# Patient Record
Sex: FEMALE | Race: BLACK OR AFRICAN AMERICAN | NOT HISPANIC OR LATINO
[De-identification: names, ages, dates, MRNs, and addresses within clinical notes are randomized per-mention and may not be internally consistent; named-entity substitution may affect disease eponyms.]

---

## 2017-01-10 ENCOUNTER — MEDICATION RENEWAL (OUTPATIENT)
Age: 72
End: 2017-01-10

## 2017-02-12 ENCOUNTER — EMERGENCY (EMERGENCY)
Facility: HOSPITAL | Age: 72
LOS: 1 days | Discharge: HOME | End: 2017-02-12
Attending: EMERGENCY MEDICINE | Admitting: EMERGENCY MEDICINE
Payer: MEDICARE

## 2017-02-12 VITALS
DIASTOLIC BLOOD PRESSURE: 65 MMHG | RESPIRATION RATE: 18 BRPM | WEIGHT: 142.2 LBS | TEMPERATURE: 98 F | SYSTOLIC BLOOD PRESSURE: 124 MMHG | OXYGEN SATURATION: 99 % | HEART RATE: 93 BPM

## 2017-02-12 VITALS
RESPIRATION RATE: 18 BRPM | HEART RATE: 85 BPM | OXYGEN SATURATION: 99 % | TEMPERATURE: 98 F | DIASTOLIC BLOOD PRESSURE: 78 MMHG | SYSTOLIC BLOOD PRESSURE: 135 MMHG

## 2017-02-12 DIAGNOSIS — R10.13 EPIGASTRIC PAIN: ICD-10-CM

## 2017-02-12 DIAGNOSIS — R94.31 ABNORMAL ELECTROCARDIOGRAM [ECG] [EKG]: ICD-10-CM

## 2017-02-12 DIAGNOSIS — R10.33 PERIUMBILICAL PAIN: ICD-10-CM

## 2017-02-12 DIAGNOSIS — R11.2 NAUSEA WITH VOMITING, UNSPECIFIED: ICD-10-CM

## 2017-02-12 LAB
APPEARANCE UR: CLEAR — SIGNIFICANT CHANGE UP
BILIRUB UR-MCNC: (no result)
COLOR SPEC: YELLOW — SIGNIFICANT CHANGE UP
DIFF PNL FLD: (no result)
GLUCOSE UR QL: NEGATIVE — SIGNIFICANT CHANGE UP
HAV IGM SER-ACNC: SIGNIFICANT CHANGE UP
HBV CORE IGM SER-ACNC: SIGNIFICANT CHANGE UP
HBV SURFACE AG SER-ACNC: SIGNIFICANT CHANGE UP
HCV AB S/CO SERPL IA: 0.1 S/CO — SIGNIFICANT CHANGE UP
HCV AB SERPL-IMP: SIGNIFICANT CHANGE UP
KETONES UR-MCNC: NEGATIVE — SIGNIFICANT CHANGE UP
LEUKOCYTE ESTERASE UR-ACNC: NEGATIVE — SIGNIFICANT CHANGE UP
NITRITE UR-MCNC: NEGATIVE — SIGNIFICANT CHANGE UP
PH UR: 7 — SIGNIFICANT CHANGE UP (ref 4–8)
PROT UR-MCNC: (no result) MG/DL
SP GR SPEC: 1.01 — SIGNIFICANT CHANGE UP (ref 1–1.03)
UROBILINOGEN FLD QL: 1 E.U./DL — SIGNIFICANT CHANGE UP

## 2017-02-12 PROCEDURE — 83690 ASSAY OF LIPASE: CPT

## 2017-02-12 PROCEDURE — 93005 ELECTROCARDIOGRAM TRACING: CPT

## 2017-02-12 PROCEDURE — 74177 CT ABD & PELVIS W/CONTRAST: CPT | Mod: 26

## 2017-02-12 PROCEDURE — 80074 ACUTE HEPATITIS PANEL: CPT

## 2017-02-12 PROCEDURE — 82550 ASSAY OF CK (CPK): CPT

## 2017-02-12 PROCEDURE — 71045 X-RAY EXAM CHEST 1 VIEW: CPT

## 2017-02-12 PROCEDURE — 99285 EMERGENCY DEPT VISIT HI MDM: CPT | Mod: 25

## 2017-02-12 PROCEDURE — 81003 URINALYSIS AUTO W/O SCOPE: CPT

## 2017-02-12 PROCEDURE — 74177 CT ABD & PELVIS W/CONTRAST: CPT

## 2017-02-12 PROCEDURE — 71010: CPT | Mod: 26

## 2017-02-12 PROCEDURE — 85730 THROMBOPLASTIN TIME PARTIAL: CPT

## 2017-02-12 PROCEDURE — 84484 ASSAY OF TROPONIN QUANT: CPT

## 2017-02-12 PROCEDURE — 76705 ECHO EXAM OF ABDOMEN: CPT

## 2017-02-12 PROCEDURE — 96374 THER/PROPH/DIAG INJ IV PUSH: CPT | Mod: XU

## 2017-02-12 PROCEDURE — 85610 PROTHROMBIN TIME: CPT

## 2017-02-12 PROCEDURE — 99284 EMERGENCY DEPT VISIT MOD MDM: CPT | Mod: 25

## 2017-02-12 PROCEDURE — 82553 CREATINE MB FRACTION: CPT

## 2017-02-12 PROCEDURE — 85025 COMPLETE CBC W/AUTO DIFF WBC: CPT

## 2017-02-12 PROCEDURE — 93010 ELECTROCARDIOGRAM REPORT: CPT

## 2017-02-12 PROCEDURE — 80053 COMPREHEN METABOLIC PANEL: CPT

## 2017-02-12 PROCEDURE — 76705 ECHO EXAM OF ABDOMEN: CPT | Mod: 26

## 2017-02-12 PROCEDURE — 81001 URINALYSIS AUTO W/SCOPE: CPT

## 2017-02-12 PROCEDURE — 96375 TX/PRO/DX INJ NEW DRUG ADDON: CPT | Mod: XU

## 2017-02-12 PROCEDURE — 36415 COLL VENOUS BLD VENIPUNCTURE: CPT

## 2017-02-12 RX ORDER — MORPHINE SULFATE 50 MG/1
2 CAPSULE, EXTENDED RELEASE ORAL ONCE
Qty: 0 | Refills: 0 | Status: DISCONTINUED | OUTPATIENT
Start: 2017-02-12 | End: 2017-02-12

## 2017-02-12 RX ORDER — ONDANSETRON 8 MG/1
4 TABLET, FILM COATED ORAL ONCE
Qty: 0 | Refills: 0 | Status: COMPLETED | OUTPATIENT
Start: 2017-02-12 | End: 2017-02-12

## 2017-02-12 RX ORDER — IOHEXOL 300 MG/ML
50 INJECTION, SOLUTION INTRAVENOUS ONCE
Qty: 0 | Refills: 0 | Status: COMPLETED | OUTPATIENT
Start: 2017-02-12 | End: 2017-02-12

## 2017-02-12 RX ADMIN — MORPHINE SULFATE 2 MILLIGRAM(S): 50 CAPSULE, EXTENDED RELEASE ORAL at 07:53

## 2017-02-12 RX ADMIN — ONDANSETRON 4 MILLIGRAM(S): 8 TABLET, FILM COATED ORAL at 07:53

## 2017-02-12 RX ADMIN — IOHEXOL 50 MILLILITER(S): 300 INJECTION, SOLUTION INTRAVENOUS at 07:53

## 2017-02-12 NOTE — ED PROVIDER NOTE - ATTENDING CONTRIBUTION TO CARE
71yof with afib, HTN, CHF with epigastric pain since yesterday and N/V 71yof with afib, HTN, CHF with epigastric pain since yesterday and N/V. Although she was triaged as CP she absolutely denies CP. EKG shows afib w/o acute ischemic changes, labs notable for increased LFTs and bilirubin. RUQ sono and CT a/p w/o acute pathology, sxs improved after meds here, needs to f/u w/her PMD re: transaminitis w/referral to GI on discharge

## 2017-02-12 NOTE — ED PROVIDER NOTE - OBJECTIVE STATEMENT
70 y/o female c/o abd pain x 1 day. Pt states pain began around 2 pm yesterday. sharp pain to epigastric and mid abdomen. Pain is intermittent. + nausea and vomiting. non bloody emesis. no fever or chills. no cp, sob, or back pain. no diarrhea or constipation. Last BM this am. non bloody and no melena. no urinary sx's. no ha or dizziness. no further complaints. 72 y/o female CHF, A fib, HTN c/o abd pain x 1 day. Pt states pain began around 2 pm yesterday. sharp pain to epigastric and mid abdomen. Pain is intermittent. + nausea and vomiting. non bloody emesis. no fever or chills. no cp, sob, or back pain. no diarrhea or constipation. Last BM this am. non bloody and no melena. no urinary sx's. no ha or dizziness. no further complaints.

## 2017-02-12 NOTE — ED PROVIDER NOTE - MEDICAL DECISION MAKING DETAILS
abd pain and vomiting. pt well appearing. VSS. ecg a fib. troponin negative. pt with chest pain. ct scan done and no acute pathology. u/s ordered given elevated LFT's. no acute pathology. pt tolerating po and pain resolved in ED. case d/w pmd Dr Mujica and results faxed to her office. will f/u with pmd and GI

## 2017-02-12 NOTE — ED ADULT NURSE REASSESSMENT NOTE - NS ED NURSE REASSESS COMMENT FT1
Report received at 0815. Pt is in no acute distress at this time. Pt is on cardiac monitor. She is drinking po contrast for CT, tolerating well.

## 2017-02-12 NOTE — ED PROVIDER NOTE - GASTROINTESTINAL, MLM
Abdomen soft, + tenderness to epigastric and periumbilical region. no guarding. no rebound. no cva tenderness.

## 2017-02-28 ENCOUNTER — RX RENEWAL (OUTPATIENT)
Age: 72
End: 2017-02-28

## 2017-04-26 ENCOUNTER — RX RENEWAL (OUTPATIENT)
Age: 72
End: 2017-04-26

## 2017-05-18 ENCOUNTER — APPOINTMENT (OUTPATIENT)
Dept: HEART AND VASCULAR | Facility: CLINIC | Age: 72
End: 2017-05-18

## 2017-05-18 VITALS
SYSTOLIC BLOOD PRESSURE: 122 MMHG | BODY MASS INDEX: 42.99 KG/M2 | HEART RATE: 80 BPM | DIASTOLIC BLOOD PRESSURE: 78 MMHG | WEIGHT: 258 LBS | HEIGHT: 65 IN

## 2017-05-18 DIAGNOSIS — R00.2 PALPITATIONS: ICD-10-CM

## 2017-06-25 ENCOUNTER — RX RENEWAL (OUTPATIENT)
Age: 72
End: 2017-06-25

## 2017-08-27 ENCOUNTER — RX RENEWAL (OUTPATIENT)
Age: 72
End: 2017-08-27

## 2017-10-02 ENCOUNTER — RX RENEWAL (OUTPATIENT)
Age: 72
End: 2017-10-02

## 2017-11-14 ENCOUNTER — RX RENEWAL (OUTPATIENT)
Age: 72
End: 2017-11-14

## 2017-12-12 ENCOUNTER — APPOINTMENT (OUTPATIENT)
Dept: HEART AND VASCULAR | Facility: CLINIC | Age: 72
End: 2017-12-12
Payer: MEDICARE

## 2017-12-12 VITALS
DIASTOLIC BLOOD PRESSURE: 80 MMHG | HEIGHT: 65 IN | BODY MASS INDEX: 41.65 KG/M2 | HEART RATE: 76 BPM | WEIGHT: 250 LBS | SYSTOLIC BLOOD PRESSURE: 110 MMHG

## 2017-12-12 PROCEDURE — 99214 OFFICE O/P EST MOD 30 MIN: CPT | Mod: 25

## 2017-12-12 PROCEDURE — 93000 ELECTROCARDIOGRAM COMPLETE: CPT

## 2017-12-15 ENCOUNTER — OUTPATIENT (OUTPATIENT)
Dept: OUTPATIENT SERVICES | Facility: HOSPITAL | Age: 72
LOS: 1 days | End: 2017-12-15
Payer: MEDICARE

## 2017-12-15 PROCEDURE — 93970 EXTREMITY STUDY: CPT

## 2017-12-15 PROCEDURE — 93970 EXTREMITY STUDY: CPT | Mod: 26

## 2018-03-05 ENCOUNTER — RX RENEWAL (OUTPATIENT)
Age: 73
End: 2018-03-05

## 2018-04-12 NOTE — ED PROVIDER NOTE - RESPIRATORY, MLM
I do not, sorry   
I found the paper work it has been faxed and sent to scanning.   
Kerrie doesn't remember anything about this. The patient is coming in Thursday.  
Received FMLA forms to complete. I have since completed them. Please fax back the cover letter from [employer] along with the completed form. Then, send a copy to scanning. Keep the original in case we need it in the future. Thanks.   
Breath sounds clear and equal bilaterally.

## 2018-05-07 ENCOUNTER — MEDICATION RENEWAL (OUTPATIENT)
Age: 73
End: 2018-05-07

## 2018-06-12 ENCOUNTER — APPOINTMENT (OUTPATIENT)
Dept: HEART AND VASCULAR | Facility: CLINIC | Age: 73
End: 2018-06-12
Payer: MEDICARE

## 2018-06-12 VITALS
SYSTOLIC BLOOD PRESSURE: 115 MMHG | HEART RATE: 60 BPM | HEIGHT: 65 IN | BODY MASS INDEX: 42.49 KG/M2 | WEIGHT: 255 LBS | DIASTOLIC BLOOD PRESSURE: 80 MMHG

## 2018-06-12 PROCEDURE — 93306 TTE W/DOPPLER COMPLETE: CPT

## 2018-06-12 PROCEDURE — 93000 ELECTROCARDIOGRAM COMPLETE: CPT

## 2018-06-12 PROCEDURE — 99214 OFFICE O/P EST MOD 30 MIN: CPT | Mod: 25

## 2018-06-15 ENCOUNTER — APPOINTMENT (OUTPATIENT)
Dept: OTOLARYNGOLOGY | Facility: CLINIC | Age: 73
End: 2018-06-15
Payer: MEDICARE

## 2018-06-15 PROCEDURE — G9164: CPT | Mod: NC,GN,CK

## 2018-06-15 PROCEDURE — G9163: CPT | Mod: NC,GN,CJ

## 2018-06-15 PROCEDURE — G9162: CPT | Mod: NC,GN,CK

## 2018-06-15 PROCEDURE — 92523 SPEECH SOUND LANG COMPREHEN: CPT | Mod: GN

## 2018-07-17 ENCOUNTER — APPOINTMENT (OUTPATIENT)
Dept: NEUROLOGY | Facility: CLINIC | Age: 73
End: 2018-07-17
Payer: MEDICARE

## 2018-07-17 VITALS
DIASTOLIC BLOOD PRESSURE: 80 MMHG | SYSTOLIC BLOOD PRESSURE: 145 MMHG | HEIGHT: 65 IN | HEART RATE: 57 BPM | TEMPERATURE: 97.5 F | OXYGEN SATURATION: 98 % | BODY MASS INDEX: 38.49 KG/M2 | WEIGHT: 231 LBS

## 2018-07-17 PROCEDURE — 99205 OFFICE O/P NEW HI 60 MIN: CPT

## 2018-07-26 ENCOUNTER — FORM ENCOUNTER (OUTPATIENT)
Age: 73
End: 2018-07-26

## 2018-07-27 ENCOUNTER — APPOINTMENT (OUTPATIENT)
Dept: CT IMAGING | Facility: HOSPITAL | Age: 73
End: 2018-07-27
Payer: MEDICARE

## 2018-07-27 ENCOUNTER — OUTPATIENT (OUTPATIENT)
Dept: OUTPATIENT SERVICES | Facility: HOSPITAL | Age: 73
LOS: 1 days | End: 2018-07-27
Payer: MEDICARE

## 2018-07-27 PROCEDURE — 70450 CT HEAD/BRAIN W/O DYE: CPT | Mod: 26

## 2018-07-27 PROCEDURE — 70450 CT HEAD/BRAIN W/O DYE: CPT

## 2018-10-09 ENCOUNTER — APPOINTMENT (OUTPATIENT)
Dept: NEUROLOGY | Facility: CLINIC | Age: 73
End: 2018-10-09
Payer: MEDICARE

## 2018-10-09 VITALS
OXYGEN SATURATION: 99 % | HEIGHT: 65 IN | DIASTOLIC BLOOD PRESSURE: 88 MMHG | TEMPERATURE: 97.9 F | BODY MASS INDEX: 37.99 KG/M2 | SYSTOLIC BLOOD PRESSURE: 135 MMHG | HEART RATE: 66 BPM | WEIGHT: 228 LBS

## 2018-10-09 DIAGNOSIS — Z80.9 FAMILY HISTORY OF MALIGNANT NEOPLASM, UNSPECIFIED: ICD-10-CM

## 2018-10-09 DIAGNOSIS — R51 HEADACHE: ICD-10-CM

## 2018-10-09 PROCEDURE — 99214 OFFICE O/P EST MOD 30 MIN: CPT

## 2018-11-01 ENCOUNTER — MEDICATION RENEWAL (OUTPATIENT)
Age: 73
End: 2018-11-01

## 2019-01-31 ENCOUNTER — APPOINTMENT (OUTPATIENT)
Dept: HEART AND VASCULAR | Facility: CLINIC | Age: 74
End: 2019-01-31

## 2019-02-04 ENCOUNTER — APPOINTMENT (OUTPATIENT)
Dept: HEART AND VASCULAR | Facility: CLINIC | Age: 74
End: 2019-02-04
Payer: MEDICARE

## 2019-02-04 ENCOUNTER — NON-APPOINTMENT (OUTPATIENT)
Age: 74
End: 2019-02-04

## 2019-02-04 VITALS
HEART RATE: 63 BPM | SYSTOLIC BLOOD PRESSURE: 122 MMHG | HEIGHT: 65 IN | DIASTOLIC BLOOD PRESSURE: 86 MMHG | BODY MASS INDEX: 37.49 KG/M2 | WEIGHT: 225 LBS

## 2019-02-04 VITALS — DIASTOLIC BLOOD PRESSURE: 70 MMHG | SYSTOLIC BLOOD PRESSURE: 122 MMHG

## 2019-02-04 PROCEDURE — 99214 OFFICE O/P EST MOD 30 MIN: CPT | Mod: 25

## 2019-02-04 PROCEDURE — 36415 COLL VENOUS BLD VENIPUNCTURE: CPT

## 2019-02-04 PROCEDURE — 93000 ELECTROCARDIOGRAM COMPLETE: CPT

## 2019-02-04 NOTE — HISTORY OF PRESENT ILLNESS
[FreeTextEntry1] : Going for Therapy for speech and Right hemiparesis. denies CP,SOB,palpitations.\par to have cataract surgery soon. still with expressive aphasia

## 2019-02-04 NOTE — REVIEW OF SYSTEMS
[Recent Weight Loss (___ Lbs)] : recent [unfilled] ~Ulb weight loss [Blurry Vision] : blurred vision [Eyeglasses] : currently wearing eyeglasses [see HPI] : see HPI [Negative] : Heme/Lymph [Fever] : no fever [Headache] : no headache [Recent Weight Gain (___ Lbs)] : no recent weight gain [Chills] : no chills [Feeling Fatigued] : not feeling fatigued [Loss Of Hearing] : no hearing loss [Sore Throat] : no sore throat [Dysuria] : no dysuria [Dizziness] : no dizziness [Tremor] : no tremor was seen [Numbness (Hypesthesia)] : no numbness [Tingling (Paresthesia)] : no tingling [Depression] : no depression [Suicidal] : not suicidal

## 2019-02-04 NOTE — PHYSICAL EXAM
[General Appearance - Well Developed] : well developed [Normal Appearance] : normal appearance [Well Groomed] : well groomed [General Appearance - Well Nourished] : well nourished [No Deformities] : no deformities [General Appearance - In No Acute Distress] : no acute distress [Normal Conjunctiva] : the conjunctiva exhibited no abnormalities [Normal Oral Mucosa] : normal oral mucosa [Normal Jugular Venous A Waves Present] : normal jugular venous A waves present [Normal Jugular Venous V Waves Present] : normal jugular venous V waves present [No Jugular Venous Omalley A Waves] : no jugular venous omalley A waves [] : no respiratory distress [Respiration, Rhythm And Depth] : normal respiratory rhythm and effort [Exaggerated Use Of Accessory Muscles For Inspiration] : no accessory muscle use [Auscultation Breath Sounds / Voice Sounds] : lungs were clear to auscultation bilaterally [Edema] : no peripheral edema present [Bowel Sounds] : normal bowel sounds [Abdomen Soft] : soft [Abdomen Tenderness] : non-tender [Nail Clubbing] : no clubbing of the fingernails [Skin Color & Pigmentation] : normal skin color and pigmentation [FreeTextEntry1] : using cane; right sided weakness

## 2019-02-04 NOTE — DISCUSSION/SUMMARY
[FreeTextEntry1] : EKG:AF\par cont Eliquis for AF;lipitor for lipids/ CVA. f/u with neurology for aphasia and hemiparesis;Lasix for diastolic CHF, Lopressor for AF/HPTN.- pt asked me to draw albs- will do but will need CBC and f/u with PCP,\par pt requested letter for me re: Eliquis

## 2019-02-05 LAB
ALBUMIN SERPL ELPH-MCNC: 4.5 G/DL
ALP BLD-CCNC: 99 U/L
ALT SERPL-CCNC: 18 U/L
ANION GAP SERPL CALC-SCNC: 11 MMOL/L
AST SERPL-CCNC: 21 U/L
BILIRUB SERPL-MCNC: 0.9 MG/DL
BUN SERPL-MCNC: 19 MG/DL
CALCIUM SERPL-MCNC: 9.4 MG/DL
CHLORIDE SERPL-SCNC: 101 MMOL/L
CHOLEST SERPL-MCNC: 143 MG/DL
CHOLEST/HDLC SERPL: 2 RATIO
CO2 SERPL-SCNC: 28 MMOL/L
CREAT SERPL-MCNC: 0.82 MG/DL
GLUCOSE SERPL-MCNC: 85 MG/DL
HDLC SERPL-MCNC: 72 MG/DL
LDLC SERPL CALC-MCNC: 62 MG/DL
NT-PROBNP SERPL-MCNC: 780 PG/ML
POTASSIUM SERPL-SCNC: 4.1 MMOL/L
PROT SERPL-MCNC: 7.6 G/DL
SODIUM SERPL-SCNC: 140 MMOL/L
TRIGL SERPL-MCNC: 47 MG/DL

## 2019-02-27 ENCOUNTER — APPOINTMENT (OUTPATIENT)
Dept: NEUROLOGY | Facility: CLINIC | Age: 74
End: 2019-02-27
Payer: MEDICARE

## 2019-02-27 VITALS
TEMPERATURE: 98.5 F | HEIGHT: 65 IN | DIASTOLIC BLOOD PRESSURE: 65 MMHG | SYSTOLIC BLOOD PRESSURE: 137 MMHG | HEART RATE: 70 BPM | OXYGEN SATURATION: 95 %

## 2019-02-27 PROCEDURE — 99214 OFFICE O/P EST MOD 30 MIN: CPT

## 2019-03-19 ENCOUNTER — NON-APPOINTMENT (OUTPATIENT)
Age: 74
End: 2019-03-19

## 2019-03-19 ENCOUNTER — APPOINTMENT (OUTPATIENT)
Age: 74
End: 2019-03-19
Payer: MEDICARE

## 2019-03-19 VITALS
HEIGHT: 65 IN | BODY MASS INDEX: 38.15 KG/M2 | OXYGEN SATURATION: 98 % | DIASTOLIC BLOOD PRESSURE: 72 MMHG | WEIGHT: 229 LBS | HEART RATE: 54 BPM | SYSTOLIC BLOOD PRESSURE: 132 MMHG

## 2019-03-19 DIAGNOSIS — H26.9 UNSPECIFIED CATARACT: ICD-10-CM

## 2019-03-19 PROCEDURE — 99214 OFFICE O/P EST MOD 30 MIN: CPT | Mod: 25

## 2019-03-19 PROCEDURE — 93000 ELECTROCARDIOGRAM COMPLETE: CPT

## 2019-03-19 NOTE — REVIEW OF SYSTEMS
[Blurry Vision] : blurred vision [Eyeglasses] : currently wearing eyeglasses [Joint Pain] : joint pain [Lower Back Pain] : lower back pain [see HPI] : see HPI [Negative] : Heme/Lymph [Fever] : no fever [Headache] : no headache [Recent Weight Gain (___ Lbs)] : no recent weight gain [Chills] : no chills [Feeling Fatigued] : not feeling fatigued [Recent Weight Loss (___ Lbs)] : no recent weight loss [Loss Of Hearing] : no hearing loss [Sore Throat] : no sore throat [Dysuria] : no dysuria [Joint Swelling] : no joint swelling [Dizziness] : no dizziness [Tremor] : no tremor was seen [Numbness (Hypesthesia)] : no numbness [Tingling (Paresthesia)] : no tingling [Depression] : no depression [Suicidal] : not suicidal

## 2019-03-19 NOTE — PHYSICAL EXAM
[General Appearance - Well Developed] : well developed [Normal Appearance] : normal appearance [Well Groomed] : well groomed [General Appearance - Well Nourished] : well nourished [No Deformities] : no deformities [General Appearance - In No Acute Distress] : no acute distress [Eyelids - No Xanthelasma] : the eyelids demonstrated no xanthelasmas [Normal Oral Mucosa] : normal oral mucosa [Normal Jugular Venous A Waves Present] : normal jugular venous A waves present [Normal Jugular Venous V Waves Present] : normal jugular venous V waves present [No Jugular Venous Omalley A Waves] : no jugular venous omalley A waves [] : no respiratory distress [Respiration, Rhythm And Depth] : normal respiratory rhythm and effort [Exaggerated Use Of Accessory Muscles For Inspiration] : no accessory muscle use [Auscultation Breath Sounds / Voice Sounds] : lungs were clear to auscultation bilaterally [Bowel Sounds] : normal bowel sounds [Abdomen Soft] : soft [Abdomen Tenderness] : non-tender [Nail Clubbing] : no clubbing of the fingernails [Oriented To Time, Place, And Person] : oriented to person, place, and time [Edema] : no peripheral edema present [FreeTextEntry1] : as above-LE bilaterally

## 2019-03-19 NOTE — DISCUSSION/SUMMARY
[FreeTextEntry1] : EKG:AF\par cont Lipitor for lipids/ for hx CVA\par cont Lopressor + Lasix for Diastolic CHF; Eliquis +Lopressor for AF.\par Her cardiac status is stable enough to permit cataract surgery; given her prior CVA would not hold Eliquis.

## 2019-04-23 RX ORDER — ONABOTULINUMTOXINA 200 [USP'U]/1
200 INJECTION, POWDER, LYOPHILIZED, FOR SOLUTION INTRADERMAL; INTRAMUSCULAR
Qty: 1 | Refills: 1 | Status: ACTIVE | COMMUNITY
Start: 2019-04-23 | End: 1900-01-01

## 2019-05-23 ENCOUNTER — INPATIENT (INPATIENT)
Facility: HOSPITAL | Age: 74
LOS: 0 days | Discharge: HOME | DRG: 66 | End: 2019-05-24
Attending: PSYCHIATRY & NEUROLOGY | Admitting: PSYCHIATRY & NEUROLOGY
Payer: MEDICARE

## 2019-05-23 VITALS
HEIGHT: 65 IN | TEMPERATURE: 98 F | DIASTOLIC BLOOD PRESSURE: 80 MMHG | WEIGHT: 229.06 LBS | RESPIRATION RATE: 16 BRPM | HEART RATE: 60 BPM | SYSTOLIC BLOOD PRESSURE: 140 MMHG | OXYGEN SATURATION: 100 %

## 2019-05-23 DIAGNOSIS — Z29.9 ENCOUNTER FOR PROPHYLACTIC MEASURES, UNSPECIFIED: ICD-10-CM

## 2019-05-23 DIAGNOSIS — I48.91 UNSPECIFIED ATRIAL FIBRILLATION: ICD-10-CM

## 2019-05-23 DIAGNOSIS — I10 ESSENTIAL (PRIMARY) HYPERTENSION: ICD-10-CM

## 2019-05-23 DIAGNOSIS — R63.8 OTHER SYMPTOMS AND SIGNS CONCERNING FOOD AND FLUID INTAKE: ICD-10-CM

## 2019-05-23 DIAGNOSIS — I63.89 OTHER CEREBRAL INFARCTION: ICD-10-CM

## 2019-05-23 DIAGNOSIS — M06.9 RHEUMATOID ARTHRITIS, UNSPECIFIED: ICD-10-CM

## 2019-05-23 DIAGNOSIS — Z98.890 OTHER SPECIFIED POSTPROCEDURAL STATES: Chronic | ICD-10-CM

## 2019-05-23 DIAGNOSIS — Z91.89 OTHER SPECIFIED PERSONAL RISK FACTORS, NOT ELSEWHERE CLASSIFIED: ICD-10-CM

## 2019-05-23 LAB
ALBUMIN SERPL ELPH-MCNC: 3.7 G/DL — SIGNIFICANT CHANGE UP (ref 3.3–5)
ALP SERPL-CCNC: 93 U/L — SIGNIFICANT CHANGE UP (ref 40–120)
ALT FLD-CCNC: 17 U/L — SIGNIFICANT CHANGE UP (ref 10–45)
ANION GAP SERPL CALC-SCNC: 10 MMOL/L — SIGNIFICANT CHANGE UP (ref 5–17)
APPEARANCE UR: CLEAR — SIGNIFICANT CHANGE UP
APTT BLD: 31.3 SEC — SIGNIFICANT CHANGE UP (ref 27.5–36.3)
AST SERPL-CCNC: 20 U/L — SIGNIFICANT CHANGE UP (ref 10–40)
BASOPHILS # BLD AUTO: 0.02 K/UL — SIGNIFICANT CHANGE UP (ref 0–0.2)
BASOPHILS NFR BLD AUTO: 0.4 % — SIGNIFICANT CHANGE UP (ref 0–2)
BILIRUB SERPL-MCNC: 0.7 MG/DL — SIGNIFICANT CHANGE UP (ref 0.2–1.2)
BILIRUB UR-MCNC: NEGATIVE — SIGNIFICANT CHANGE UP
BUN SERPL-MCNC: 22 MG/DL — SIGNIFICANT CHANGE UP (ref 7–23)
CALCIUM SERPL-MCNC: 9.5 MG/DL — SIGNIFICANT CHANGE UP (ref 8.4–10.5)
CHLORIDE SERPL-SCNC: 105 MMOL/L — SIGNIFICANT CHANGE UP (ref 96–108)
CO2 SERPL-SCNC: 28 MMOL/L — SIGNIFICANT CHANGE UP (ref 22–31)
COLOR SPEC: YELLOW — SIGNIFICANT CHANGE UP
CREAT SERPL-MCNC: 0.84 MG/DL — SIGNIFICANT CHANGE UP (ref 0.5–1.3)
DIFF PNL FLD: ABNORMAL
EOSINOPHIL # BLD AUTO: 0.08 K/UL — SIGNIFICANT CHANGE UP (ref 0–0.5)
EOSINOPHIL NFR BLD AUTO: 1.6 % — SIGNIFICANT CHANGE UP (ref 0–6)
GLUCOSE SERPL-MCNC: 89 MG/DL — SIGNIFICANT CHANGE UP (ref 70–99)
GLUCOSE UR QL: NEGATIVE — SIGNIFICANT CHANGE UP
HCT VFR BLD CALC: 38.4 % — SIGNIFICANT CHANGE UP (ref 34.5–45)
HGB BLD-MCNC: 12.5 G/DL — SIGNIFICANT CHANGE UP (ref 11.5–15.5)
IMM GRANULOCYTES NFR BLD AUTO: 0.2 % — SIGNIFICANT CHANGE UP (ref 0–1.5)
INR BLD: 1.24 — HIGH (ref 0.88–1.16)
KETONES UR-MCNC: NEGATIVE — SIGNIFICANT CHANGE UP
LEUKOCYTE ESTERASE UR-ACNC: ABNORMAL
LYMPHOCYTES # BLD AUTO: 2.09 K/UL — SIGNIFICANT CHANGE UP (ref 1–3.3)
LYMPHOCYTES # BLD AUTO: 42.1 % — SIGNIFICANT CHANGE UP (ref 13–44)
MCHC RBC-ENTMCNC: 31.2 PG — SIGNIFICANT CHANGE UP (ref 27–34)
MCHC RBC-ENTMCNC: 32.6 GM/DL — SIGNIFICANT CHANGE UP (ref 32–36)
MCV RBC AUTO: 95.8 FL — SIGNIFICANT CHANGE UP (ref 80–100)
MONOCYTES # BLD AUTO: 0.47 K/UL — SIGNIFICANT CHANGE UP (ref 0–0.9)
MONOCYTES NFR BLD AUTO: 9.5 % — SIGNIFICANT CHANGE UP (ref 2–14)
NEUTROPHILS # BLD AUTO: 2.29 K/UL — SIGNIFICANT CHANGE UP (ref 1.8–7.4)
NEUTROPHILS NFR BLD AUTO: 46.2 % — SIGNIFICANT CHANGE UP (ref 43–77)
NITRITE UR-MCNC: NEGATIVE — SIGNIFICANT CHANGE UP
NRBC # BLD: 0 /100 WBCS — SIGNIFICANT CHANGE UP (ref 0–0)
PH UR: 7.5 — SIGNIFICANT CHANGE UP (ref 5–8)
PLATELET # BLD AUTO: 160 K/UL — SIGNIFICANT CHANGE UP (ref 150–400)
POTASSIUM SERPL-MCNC: 4.3 MMOL/L — SIGNIFICANT CHANGE UP (ref 3.5–5.3)
POTASSIUM SERPL-SCNC: 4.3 MMOL/L — SIGNIFICANT CHANGE UP (ref 3.5–5.3)
PROT SERPL-MCNC: 7.7 G/DL — SIGNIFICANT CHANGE UP (ref 6–8.3)
PROT UR-MCNC: ABNORMAL MG/DL
PROTHROM AB SERPL-ACNC: 14.1 SEC — HIGH (ref 10–12.9)
RBC # BLD: 4.01 M/UL — SIGNIFICANT CHANGE UP (ref 3.8–5.2)
RBC # FLD: 13 % — SIGNIFICANT CHANGE UP (ref 10.3–14.5)
SODIUM SERPL-SCNC: 143 MMOL/L — SIGNIFICANT CHANGE UP (ref 135–145)
SP GR SPEC: 1.01 — SIGNIFICANT CHANGE UP (ref 1–1.03)
UROBILINOGEN FLD QL: 1 E.U./DL — SIGNIFICANT CHANGE UP
WBC # BLD: 4.96 K/UL — SIGNIFICANT CHANGE UP (ref 3.8–10.5)
WBC # FLD AUTO: 4.96 K/UL — SIGNIFICANT CHANGE UP (ref 3.8–10.5)

## 2019-05-23 PROCEDURE — 99285 EMERGENCY DEPT VISIT HI MDM: CPT

## 2019-05-23 PROCEDURE — 70496 CT ANGIOGRAPHY HEAD: CPT | Mod: 26

## 2019-05-23 PROCEDURE — 99223 1ST HOSP IP/OBS HIGH 75: CPT

## 2019-05-23 PROCEDURE — 70450 CT HEAD/BRAIN W/O DYE: CPT | Mod: 26,59

## 2019-05-23 PROCEDURE — 70498 CT ANGIOGRAPHY NECK: CPT | Mod: 26

## 2019-05-23 PROCEDURE — 70551 MRI BRAIN STEM W/O DYE: CPT | Mod: 26

## 2019-05-23 RX ORDER — FOLIC ACID 0.8 MG
1 TABLET ORAL
Qty: 0 | Refills: 0 | DISCHARGE

## 2019-05-23 RX ORDER — FLUOXETINE HCL 10 MG
10 CAPSULE ORAL DAILY
Refills: 0 | Status: DISCONTINUED | OUTPATIENT
Start: 2019-05-24 | End: 2019-05-24

## 2019-05-23 RX ORDER — METHOTREXATE 2.5 MG/1
0 TABLET ORAL
Qty: 0 | Refills: 0 | DISCHARGE

## 2019-05-23 RX ORDER — SENNA PLUS 8.6 MG/1
1 TABLET ORAL
Qty: 0 | Refills: 0 | DISCHARGE

## 2019-05-23 RX ORDER — METOPROLOL TARTRATE 50 MG
50 TABLET ORAL
Refills: 0 | Status: DISCONTINUED | OUTPATIENT
Start: 2019-05-23 | End: 2019-05-24

## 2019-05-23 RX ORDER — FUROSEMIDE 40 MG
1 TABLET ORAL
Qty: 0 | Refills: 0 | DISCHARGE

## 2019-05-23 RX ORDER — METHOTREXATE 2.5 MG/1
2.5 TABLET ORAL ONCE
Refills: 0 | Status: COMPLETED | OUTPATIENT
Start: 2019-05-24 | End: 2019-05-24

## 2019-05-23 RX ORDER — APIXABAN 2.5 MG/1
1 TABLET, FILM COATED ORAL
Qty: 0 | Refills: 0 | DISCHARGE

## 2019-05-23 RX ORDER — FLUOXETINE HCL 10 MG
1 CAPSULE ORAL
Qty: 0 | Refills: 0 | DISCHARGE

## 2019-05-23 RX ORDER — CHOLECALCIFEROL (VITAMIN D3) 125 MCG
0 CAPSULE ORAL
Qty: 0 | Refills: 0 | DISCHARGE

## 2019-05-23 RX ORDER — ATORVASTATIN CALCIUM 80 MG/1
40 TABLET, FILM COATED ORAL AT BEDTIME
Refills: 0 | Status: DISCONTINUED | OUTPATIENT
Start: 2019-05-23 | End: 2019-05-24

## 2019-05-23 RX ORDER — ENOXAPARIN SODIUM 100 MG/ML
100 INJECTION SUBCUTANEOUS EVERY 12 HOURS
Refills: 0 | Status: DISCONTINUED | OUTPATIENT
Start: 2019-05-23 | End: 2019-05-24

## 2019-05-23 RX ADMIN — Medication 50 MILLIGRAM(S): at 23:50

## 2019-05-23 RX ADMIN — ATORVASTATIN CALCIUM 40 MILLIGRAM(S): 80 TABLET, FILM COATED ORAL at 21:17

## 2019-05-23 RX ADMIN — ENOXAPARIN SODIUM 100 MILLIGRAM(S): 100 INJECTION SUBCUTANEOUS at 21:17

## 2019-05-23 NOTE — ED ADULT TRIAGE NOTE - CHIEF COMPLAINT QUOTE
as per patient and EMS patient has been having slurred speech for a 1 week. Pt has hx of stroke in the past with some right sided weakness as residual. Pt denies headache, dizziness, weakness at this time.

## 2019-05-23 NOTE — ED PROVIDER NOTE - CLINICAL SUMMARY MEDICAL DECISION MAKING FREE TEXT BOX
here w/ concern for possible new CVA, plan for repeat CTs, labs, and admission for monitoring, eventual MRI

## 2019-05-23 NOTE — ED ADULT NURSE NOTE - NSIMPLEMENTINTERV_GEN_ALL_ED
Implemented All Fall Risk Interventions:  [place] to call system. Call bell, personal items and telephone within reach. Instruct patient to call for assistance. Room bathroom lighting operational. Non-slip footwear when patient is off stretcher. Physically safe environment: no spills, clutter or unnecessary equipment. Stretcher in lowest position, wheels locked, appropriate side rails in place. Provide visual cue, wrist band, yellow gown, etc. Monitor gait and stability. Monitor for mental status changes and reorient to person, place, and time. Review medications for side effects contributing to fall risk. Reinforce activity limits and safety measures with patient and family.

## 2019-05-23 NOTE — ED ADULT NURSE NOTE - CHPI ED NUR SYMPTOMS NEG
no vomiting/no confusion/no dizziness/no weakness/no fever/no blurred vision/no change in level of consciousness/no loss of consciousness/no nausea/no numbness

## 2019-05-23 NOTE — H&P ADULT - NSHPLABSRESULTS_GEN_ALL_CORE
.  LABS:                         12.5   4.96  )-----------( 160      ( 23 May 2019 17:03 )             38.4         143  |  105  |  22  ----------------------------<  89  4.3   |  28  |  0.84    Ca    9.5      23 May 2019 17:03    TPro  7.7  /  Alb  3.7  /  TBili  0.7  /  DBili  x   /  AST  20  /  ALT  17  /  AlkPhos  93  -    PT/INR - ( 23 May 2019 17:03 )   PT: 14.1 sec;   INR: 1.24          PTT - ( 23 May 2019 17:03 )  PTT:31.3 sec  Urinalysis Basic - ( 23 May 2019 18:37 )    Color: Yellow / Appearance: Clear / S.010 / pH: x  Gluc: x / Ketone: NEGATIVE  / Bili: Negative / Urobili: 1.0 E.U./dL   Blood: x / Protein: Trace mg/dL / Nitrite: NEGATIVE   Leuk Esterase: Trace / RBC: < 5 /HPF / WBC < 5 /HPF   Sq Epi: x / Non Sq Epi: 0-5 /HPF / Bacteria: Present /HPF                RADIOLOGY, EKG & ADDITIONAL TESTS: Reviewed.

## 2019-05-23 NOTE — H&P ADULT - PROBLEM SELECTOR PLAN 1
Pt presents with worsening speech difficulty for one week. CTH/CTA read as L frontal/basal ganglia/corona radiata infarcts. Per neuro attending possible left acute on chronic MCA infarct.   - hold home eliquis and switch to lovenox.  - atorvastatin 40mg   - PT/OT  - Speech therapy   - f/up BIBIANA  - f/up MRI

## 2019-05-23 NOTE — H&P ADULT - HISTORY OF PRESENT ILLNESS
A 72yo F with PMH of L MCA CVA s/p thrombectomy in March 2018 at [facility] (with residual right arm weakness and aphasia), AFib on Eliquis, HLD, and RA presents with worsening speech difficulty for one week.     In ED VS: T 97.6, HR 60, /80, RR 16, SpO2 100 on RA. Labs s/f Pt 14.1, INR 1.24, normal cbc/bnp, CTH/CTA read as L frontal/basal ganglia/corona radiata infarcts. Per neuro attending possible left acute on chronic MCA infarct.  Pt admitted to [facility] for further management A 74yo F with PMH of L MCA CVA s/p thrombectomy in March 2018 at [facility] (with residual right arm weakness and aphasia), AFib on Eliquis, HLD, and RA presents with worsening speech difficulty for one week. Per pt, she has noticed more word finding dificulty from her baseline. Pt called Dr. Verdin     In ED VS: T 97.6, HR 60, /80, RR 16, SpO2 100 on RA. Labs s/f Pt 14.1, INR 1.24, normal cbc/bnp, CTH/CTA read as L frontal/basal ganglia/corona radiata infarcts. Per neuro attending possible left acute on chronic MCA infarct.  Pt admitted to [facility] for further management A 72yo F with PMH of L MCA CVA s/p thrombectomy in March 2018 at [facility] (with residual right arm weakness and aphasia), AFib on Eliquis, HLD, and RA presents with worsening speech difficulty for one week. Per pt, she has noticed more word finding dificulty from her baseline. Pt called Dr. Verdin office today and was told to come to the ED. Pt has residual right sided weakness from prior stroke. She noticed no change from baseline. Pt denies new weakness, numbness/tingling, headache, fevers chills, nausea/vomiting, abdominal pain.     In ED VS: T 97.6, HR 60, /80, RR 16, SpO2 100 on RA. Labs s/f Pt 14.1, INR 1.24, normal cbc/bnp, CTH/CTA read as L frontal/basal ganglia/corona radiata infarcts. Per neuro attending possible left acute on chronic MCA infarct.  Pt admitted to [facility] for further management

## 2019-05-23 NOTE — ED PROVIDER NOTE - PHYSICAL EXAMINATION
CONSTITUTIONAL: Well-appearing; well-nourished; in no apparent distress.   HEAD: Normocephalic; atraumatic.   EYES:  conjunctiva and sclera clear  ENT: normal nose; no rhinorrhea; normal pharynx with no erythema or lesions.   NECK: Supple; non-tender;   CARDIOVASCULAR: Normal S1, S2; no murmurs, rubs, or gallops. Regular rate and rhythm.   RESPIRATORY: Breathing easily; breath sounds clear and equal bilaterally; no wheezes, rhonchi, or rales.  GI: Soft; non-distended; non-tender; no palpable organomegaly.   EXT: No cyanosis or edema; N/V intact  SKIN: Normal for age and race; warm; dry; good turgor; no apparent lesions or rash.   NEURO: A & O x 3; face symmetric; R arm contracted and weak, +slurred speech, +word finding difficulty  PSYCHOLOGICAL: The patient’s mood and manner are appropriate.

## 2019-05-23 NOTE — H&P ADULT - PROBLEM SELECTOR PLAN 4
Pt with hx of HTN. Pt takes Metoprolol tartrate 50mg BID and Lasix 40mg daily. Unclear of why pt takes lasix, no known hx of HF, no edema on exam. Pt sees cardiologist Dr. Maradiaga  - obtain collateral in AM  - c/w lopressor   - hold lasix for permissive HTN  - f/up BIBIANA

## 2019-05-23 NOTE — H&P ADULT - NSICDXPASTMEDICALHX_GEN_ALL_CORE_FT
PAST MEDICAL HISTORY:  Atrial fibrillation PAST MEDICAL HISTORY:  Atrial fibrillation     HTN (hypertension)     Rheumatoid arthritis

## 2019-05-23 NOTE — ED ADULT NURSE NOTE - OBJECTIVE STATEMENT
Pt reports her home health aide found her to have slurred speech today, and pt reports slurred speech for a week. Pt reports residual right sided weakness d/t stroke one year ago. Pt reports "I feel fine." at this time, denies ehadache, changes in vision, dizziness, new weakness.

## 2019-05-23 NOTE — ED PROVIDER NOTE - CARE PLAN
Principal Discharge DX:	Slurred speech  Secondary Diagnosis:	Cerebrovascular accident (CVA) due to other mechanism

## 2019-05-23 NOTE — CONSULT NOTE ADULT - SUBJECTIVE AND OBJECTIVE BOX
Neurology Stroke Consult Note    INTERVAL HPI/OVERNIGHT EVENTS:  Patient seen and examined.     MEDICATIONS  (STANDING):    MEDICATIONS  (PRN):      Allergies    No Known Allergies    Intolerances        ROS: As per HPI, otherwise negative    Vital Signs Last 24 Hrs  T(C): 36.4 (23 May 2019 15:58), Max: 36.4 (23 May 2019 15:58)  T(F): 97.6 (23 May 2019 15:58), Max: 97.6 (23 May 2019 15:58)  HR: 60 (23 May 2019 15:58) (60 - 60)  BP: 140/80 (23 May 2019 15:58) (140/80 - 140/80)  BP(mean): --  RR: 16 (23 May 2019 15:58) (16 - 16)  SpO2: 100% (23 May 2019 15:58) (100% - 100%)    Physical exam:  General: awake and alert, sitting comfortably, no acute distress    Neurologic:  Mental status: awake, alert, oriented to person, place, and time. Speech is fluent - able to name, repeat, and describe picture fully. Follows commands. Attention/concentration intact. No dysarthria, no aphasia.  Cranial nerves:   II: visual fields are full to confrontation. pupils equally round and reactive to light,   III, IV, VI: EOMI without nystagmus  V:  V1-V3 sensation intact,   VII: no facial droop, facie is symmetric with normal eye closure and smile  VIII: hearing is intact to finger rub  IX, X: Uvula is midline and soft palate rises symmetrically  XI: head turning and shoulder shrug are intact.  XII: tongue midline  Motor: Normal bulk and tone, strength 5/5 in b/l UE and LE,  strength 5/5. No pronator drift  Sensation: intact to light touch. No neglect.  Coordination: No dysmetria on finger-to-nose and heel-to-shin  Reflexes: downgoing toes bilaterally  Gait: narrow and steady, no ataxia. Romberg negative        LABS:                        12.5   4.96  )-----------( 160      ( 23 May 2019 17:03 )             38.4     05-23    143  |  105  |  22  ----------------------------<  89  4.3   |  28  |  0.84    Ca    9.5      23 May 2019 17:03    TPro  7.7  /  Alb  3.7  /  TBili  0.7  /  DBili  x   /  AST  20  /  ALT  17  /  AlkPhos  93  05-23    PT/INR - ( 23 May 2019 17:03 )   PT: 14.1 sec;   INR: 1.24          PTT - ( 23 May 2019 17:03 )  PTT:31.3 sec      RADIOLOGY & ADDITIONAL TESTS:      Assessment and Plan  73y Female    1)Secondary stroke prevention  -ASA 81 and Plavix 75  -Atorvastatin 80    2) Stroke risk factors  -    3) Further workup     DVT prophylaxis   -Heparin SQ TID and SCDs Neurology Stroke Consult Note    HPI        Allergies    No Known Allergies          ROS: As per HPI, otherwise negative    Vital Signs Last 24 Hrs  T(C): 36.4 (23 May 2019 15:58), Max: 36.4 (23 May 2019 15:58)  T(F): 97.6 (23 May 2019 15:58), Max: 97.6 (23 May 2019 15:58)  HR: 60 (23 May 2019 15:58) (60 - 60)  BP: 140/80 (23 May 2019 15:58) (140/80 - 140/80)  BP(mean): --  RR: 16 (23 May 2019 15:58) (16 - 16)  SpO2: 100% (23 May 2019 15:58) (100% - 100%)    Physical exam:  General: awake and alert, sitting comfortably, no acute distress    Neurologic:  Mental status: awake, alert, oriented to person, place, and time. Speech is fluent. Follows commands. Attention/concentration intact. No dysarthria, slow to speak with some word finding difficulties.   Cranial nerves:   II: visual fields are full to confrontation. pupils equally round and reactive to light,   III, IV, VI: EOMI without nystagmus  VII: no facial droop, facie is symmetric with normal eye closure and smile  XII: tongue midline  Motor: Normal bulk , strength 5/5 in b/l LE and left UE. Right arm with increased tone, 4+/5. Right leg 4+/5.  Right pronator drift  Sensation: sintact to light touch. No neglect.  Coordination: No dysmetria on finger-to-nose  Reflexes: downgoing toes bilaterally  Gait: deferred    NIHSS 2    LABS:                        12.5   4.96  )-----------( 160      ( 23 May 2019 17:03 )             38.4     05-23    143  |  105  |  22  ----------------------------<  89  4.3   |  28  |  0.84    Ca    9.5      23 May 2019 17:03    TPro  7.7  /  Alb  3.7  /  TBili  0.7  /  DBili  x   /  AST  20  /  ALT  17  /  AlkPhos  93  05-23    PT/INR - ( 23 May 2019 17:03 )   PT: 14.1 sec;   INR: 1.24          PTT - ( 23 May 2019 17:03 )  PTT:31.3 sec      RADIOLOGY & ADDITIONAL TESTS:      Assessment and Plan  73y Female w/ PMH left MCA CVA s/p thrombectomy in March 2018 at [facility] (with some residual right arm weakness and some aphasia, afib on eliquis, coming in with worsening speech difficulty for one week.     1)Secondary stroke prevention  -Continue home eliquis  -Atorvastatin 80    2) Further workup   -UA  -afebrile, no leukocytosis, not metabolic abnormalities  -CT head and CTA head and neck  -Admit to 7[facility] under Dr. Gonzalez for MRI Neurology Stroke Consult Note    HPI  73y Female w/ PMH left MCA CVA s/p thrombectomy in March 2018 at [facility] (with some residual right arm weakness and some aphasia, afib on eliquis, coming in with worsening speech difficulty for one week. Pt states that last Thursday she noticed that she was having more difficulty getting words out, worse than her baseline. She noticed it more today so called Dr. Adams's office, who advised her to go to the ER. Pt denies any new weakness, numbness, dizziness, or headache.     BP in the ER noraml at 140/80.      Allergies    No Known Allergies          ROS: As per HPI, otherwise negative    Vital Signs Last 24 Hrs  T(C): 36.4 (23 May 2019 15:58), Max: 36.4 (23 May 2019 15:58)  T(F): 97.6 (23 May 2019 15:58), Max: 97.6 (23 May 2019 15:58)  HR: 60 (23 May 2019 15:58) (60 - 60)  BP: 140/80 (23 May 2019 15:58) (140/80 - 140/80)  BP(mean): --  RR: 16 (23 May 2019 15:58) (16 - 16)  SpO2: 100% (23 May 2019 15:58) (100% - 100%)    Physical exam:  General: awake and alert, sitting comfortably, no acute distress    Neurologic:  Mental status: awake, alert, oriented to person, place, and time. Speech is fluent. Follows commands. Attention/concentration intact. No dysarthria, slow to speak with some word finding difficulties.   Cranial nerves:   II: visual fields are full to confrontation. pupils equally round and reactive to light,   III, IV, VI: EOMI without nystagmus  VII: no facial droop, facie is symmetric with normal eye closure and smile  XII: tongue midline  Motor: Normal bulk , strength 5/5 in b/l LE and left UE. Right arm with increased tone, 4+/5. Right leg 4+/5.  Right pronator drift  Sensation: sintact to light touch. No neglect.  Coordination: No dysmetria on finger-to-nose  Reflexes: downgoing toes bilaterally  Gait: deferred    NIHSS 2    LABS:                        12.5   4.96  )-----------( 160      ( 23 May 2019 17:03 )             38.4     05-23    143  |  105  |  22  ----------------------------<  89  4.3   |  28  |  0.84    Ca    9.5      23 May 2019 17:03    TPro  7.7  /  Alb  3.7  /  TBili  0.7  /  DBili  x   /  AST  20  /  ALT  17  /  AlkPhos  93  05-23    PT/INR - ( 23 May 2019 17:03 )   PT: 14.1 sec;   INR: 1.24          PTT - ( 23 May 2019 17:03 )  PTT:31.3 sec      RADIOLOGY & ADDITIONAL TESTS:        Assessment and Plan  73y Female w/ PMH left MCA CVA s/p thrombectomy in March 2018 at [facility] (with some residual right arm weakness and some aphasia, afib on eliquis, coming in with worsening speech difficulty for one week.     1)Secondary stroke prevention  -Continue home eliquis  -Atorvastatin 80    2) Further workup   -UA  -afebrile, no leukocytosis, not metabolic abnormalities  -CT head and CTA head and neck Neurology Stroke Consult Note    HPI  73y Female w/ PMH left MCA CVA s/p thrombectomy in March 2018 at [facility] (with some residual right arm weakness and some aphasia, afib on eliquis, HLD, RA, coming in with worsening speech difficulty for one week. Pt states that last Thursday she noticed that she was having more difficulty getting words out, worse than her baseline. She noticed it more today so called Dr. Adams's office, who advised her to go to the ER. Pt denies any new weakness, numbness, dizziness, or headache.     BP in the ER normal at 140/80.    CT reviewed with Dr. Gonzalez, showing some subacute stroke       Allergies    No Known Allergies    home meds:  Eliquis 5 BID  Atorvastatin 10  Fluoxetine 20   Lasix 40  Lopressor 50 BID  Methotrexate weekly        ROS: As per HPI, otherwise negative    Vital Signs Last 24 Hrs  T(C): 36.4 (23 May 2019 15:58), Max: 36.4 (23 May 2019 15:58)  T(F): 97.6 (23 May 2019 15:58), Max: 97.6 (23 May 2019 15:58)  HR: 60 (23 May 2019 15:58) (60 - 60)  BP: 140/80 (23 May 2019 15:58) (140/80 - 140/80)  BP(mean): --  RR: 16 (23 May 2019 15:58) (16 - 16)  SpO2: 100% (23 May 2019 15:58) (100% - 100%)    Physical exam:  General: awake and alert, sitting comfortably, no acute distress    Neurologic:  Mental status: awake, alert, oriented to person, place, and time. Speech is fluent. Follows commands. Attention/concentration intact. No dysarthria, slow to speak with some word finding difficulties.   Cranial nerves:   II: visual fields are full to confrontation. pupils equally round and reactive to light,   III, IV, VI: EOMI without nystagmus  VII: no facial droop, facie is symmetric with normal eye closure and smile  XII: tongue midline  Motor: Normal bulk , strength 5/5 in b/l LE and left UE. Right arm with increased tone, 4+/5. Right leg 4+/5.  Right pronator drift  Sensation: sintact to light touch. No neglect.  Coordination: No dysmetria on finger-to-nose  Reflexes: downgoing toes bilaterally  Gait: deferred    NIHSS 2    LABS:                        12.5   4.96  )-----------( 160      ( 23 May 2019 17:03 )             38.4     05-23    143  |  105  |  22  ----------------------------<  89  4.3   |  28  |  0.84    Ca    9.5      23 May 2019 17:03    TPro  7.7  /  Alb  3.7  /  TBili  0.7  /  DBili  x   /  AST  20  /  ALT  17  /  AlkPhos  93  05-23    PT/INR - ( 23 May 2019 17:03 )   PT: 14.1 sec;   INR: 1.24          PTT - ( 23 May 2019 17:03 )  PTT:31.3 sec      RADIOLOGY & ADDITIONAL TESTS:        Assessment and Plan  73y Female w/ PMH left MCA CVA s/p thrombectomy in March 2018 at [facility] (with some residual right arm weakness and some aphasia, afib on eliquis, coming in with worsening speech difficulty for one week.     1)Secondary stroke prevention  -Continue home eliquis  -Increase Atorvastatin to 40    2) Further workup   -UA  -afebrile, no leukocytosis, not metabolic abnormalities  -CT head and CTA head and neck  -CT head done - reviewed with Dr. Gonzalez, some changes since 7/2018 and with subacute stroke  -Will need [facility] admission under Dr. Gonzalez  -Will need BIBIANA and MRI Neurology Stroke Consult Note    HPI  73y Female w/ PMH left MCA CVA s/p thrombectomy in March 2018 at [facility] (with some residual right arm weakness and some aphasia, afib on eliquis, HLD, RA, coming in with worsening speech difficulty for one week. Pt states that last Thursday she noticed that she was having more difficulty getting words out, worse than her baseline. She noticed it more today so called Dr. Adams's office, who advised her to go to the ER. Pt denies any new weakness, numbness, dizziness, or headache.     BP in the ER normal at 140/80.    CT reviewed with Dr. Gonzalez, showing some subacute stroke       Allergies    No Known Allergies    home meds:  Eliquis 5 BID  Atorvastatin 10  Fluoxetine 20   Lasix 40  Lopressor 50 BID  Methotrexate weekly        ROS: As per HPI, otherwise negative    Vital Signs Last 24 Hrs  T(C): 36.4 (23 May 2019 15:58), Max: 36.4 (23 May 2019 15:58)  T(F): 97.6 (23 May 2019 15:58), Max: 97.6 (23 May 2019 15:58)  HR: 60 (23 May 2019 15:58) (60 - 60)  BP: 140/80 (23 May 2019 15:58) (140/80 - 140/80)  BP(mean): --  RR: 16 (23 May 2019 15:58) (16 - 16)  SpO2: 100% (23 May 2019 15:58) (100% - 100%)    Physical exam:  General: awake and alert, sitting comfortably, no acute distress    Neurologic:  Mental status: awake, alert, oriented to person, place, and time. Speech is fluent. Follows commands. Attention/concentration intact. No dysarthria, slow to speak with some word finding difficulties.   Cranial nerves:   II: visual fields are full to confrontation. pupils equally round and reactive to light,   III, IV, VI: EOMI without nystagmus  VII: no facial droop, facie is symmetric with normal eye closure and smile  XII: tongue midline  Motor: Normal bulk , strength 5/5 in b/l LE and left UE. Right arm with increased tone, 4+/5. Right leg 4+/5.  Right pronator drift  Sensation: sintact to light touch. No neglect.  Coordination: No dysmetria on finger-to-nose  Reflexes: downgoing toes bilaterally  Gait: deferred    NIHSS 2    LABS:                        12.5   4.96  )-----------( 160      ( 23 May 2019 17:03 )             38.4     05-23    143  |  105  |  22  ----------------------------<  89  4.3   |  28  |  0.84    Ca    9.5      23 May 2019 17:03    TPro  7.7  /  Alb  3.7  /  TBili  0.7  /  DBili  x   /  AST  20  /  ALT  17  /  AlkPhos  93  05-23    PT/INR - ( 23 May 2019 17:03 )   PT: 14.1 sec;   INR: 1.24          PTT - ( 23 May 2019 17:03 )  PTT:31.3 sec      RADIOLOGY & ADDITIONAL TESTS:        Assessment and Plan  73y Female w/ PMH left MCA CVA s/p thrombectomy in March 2018 at [facility] (with some residual right arm weakness and some aphasia, afib on eliquis, coming in with worsening speech difficulty for one week.     1)Secondary stroke prevention  -Stop home eliquis - switch to lovenox 100 mg BID  -Increase Atorvastatin to 40    2) Further workup   -UA  -afebrile, no leukocytosis, not metabolic abnormalities  -CT head and CTA head and neck  -CT head done - reviewed with Dr. Gonzalez, some changes since 7/2018 and with subacute stroke  -Will need 7[facility] admission under Dr. Gonzalez  -Will need BIBIANA and MRI

## 2019-05-23 NOTE — ED PROVIDER NOTE - OBJECTIVE STATEMENT
A 74yo F with PMH of L MCA CVA s/p thrombectomy in March 2018 at [facility] (with residual right arm weakness and aphasia), AFib on Eliquis, HLD, and RA presents with worsening speech difficulty for one week. Per pt, she has noticed more word finding dificulty from her baseline. Pt called Dr. Verdin office today and was told to come to the ED. Pt has residual right sided weakness from prior stroke. She noticed no change from baseline. Pt denies new weakness, numbness/tingling, headache, fevers chills, nausea/vomiting, abdominal pain. No factors seem to improve or worsening symptoms.

## 2019-05-23 NOTE — H&P ADULT - PROBLEM SELECTOR PLAN 7
1) PCP Contacted on Admission: (Y/N) --> Name & Phone #: Dr. Douglass [phone]   2) Date of Contact with PCP:  3) PCP Contacted at Discharge: (Y/N, N/A)  4) Summary of Handoff Given to PCP:   5) Post-Discharge Appointment Date and Location:

## 2019-05-23 NOTE — H&P ADULT - PROBLEM SELECTOR PLAN 2
Pt with hx of Afib diagnosed during stroke work up March 2018. Pt take metoprolol  Tartrate 50mg BID and Eliquis 5mg BID and has been compliant with all meds.   - c/w metoprolol 50mg BID  - change eliquis to lovenox   - monitor HR

## 2019-05-23 NOTE — H&P ADULT - ASSESSMENT
73y Female w/ PMH left MCA CVA s/p thrombectomy in March 2018 at [facility] (with some residual right arm weakness and some aphasia, afib on eliquis, coming in with worsening speech difficulty for one week.

## 2019-05-24 ENCOUNTER — TRANSCRIPTION ENCOUNTER (OUTPATIENT)
Age: 74
End: 2019-05-24

## 2019-05-24 VITALS
DIASTOLIC BLOOD PRESSURE: 51 MMHG | HEART RATE: 68 BPM | OXYGEN SATURATION: 100 % | RESPIRATION RATE: 22 BRPM | SYSTOLIC BLOOD PRESSURE: 92 MMHG

## 2019-05-24 LAB
GLUCOSE BLDC GLUCOMTR-MCNC: 103 MG/DL — HIGH (ref 70–99)
GLUCOSE BLDC GLUCOMTR-MCNC: 67 MG/DL — LOW (ref 70–99)

## 2019-05-24 PROCEDURE — 36415 COLL VENOUS BLD VENIPUNCTURE: CPT

## 2019-05-24 PROCEDURE — 92523 SPEECH SOUND LANG COMPREHEN: CPT

## 2019-05-24 PROCEDURE — 80053 COMPREHEN METABOLIC PANEL: CPT

## 2019-05-24 PROCEDURE — 81001 URINALYSIS AUTO W/SCOPE: CPT

## 2019-05-24 PROCEDURE — 97161 PT EVAL LOW COMPLEX 20 MIN: CPT

## 2019-05-24 PROCEDURE — 70498 CT ANGIOGRAPHY NECK: CPT

## 2019-05-24 PROCEDURE — 82962 GLUCOSE BLOOD TEST: CPT

## 2019-05-24 PROCEDURE — 93005 ELECTROCARDIOGRAM TRACING: CPT

## 2019-05-24 PROCEDURE — 85730 THROMBOPLASTIN TIME PARTIAL: CPT

## 2019-05-24 PROCEDURE — 70450 CT HEAD/BRAIN W/O DYE: CPT

## 2019-05-24 PROCEDURE — 99285 EMERGENCY DEPT VISIT HI MDM: CPT | Mod: 25

## 2019-05-24 PROCEDURE — 93312 ECHO TRANSESOPHAGEAL: CPT

## 2019-05-24 PROCEDURE — 85025 COMPLETE CBC W/AUTO DIFF WBC: CPT

## 2019-05-24 PROCEDURE — 85610 PROTHROMBIN TIME: CPT

## 2019-05-24 PROCEDURE — ZZZZZ: CPT

## 2019-05-24 PROCEDURE — 99232 SBSQ HOSP IP/OBS MODERATE 35: CPT

## 2019-05-24 PROCEDURE — 70551 MRI BRAIN STEM W/O DYE: CPT

## 2019-05-24 PROCEDURE — 70496 CT ANGIOGRAPHY HEAD: CPT

## 2019-05-24 RX ORDER — METOPROLOL TARTRATE 50 MG
1 TABLET ORAL
Qty: 0 | Refills: 0 | DISCHARGE

## 2019-05-24 RX ORDER — ATORVASTATIN CALCIUM 80 MG/1
1 TABLET, FILM COATED ORAL
Qty: 30 | Refills: 0
Start: 2019-05-24 | End: 2019-06-22

## 2019-05-24 RX ORDER — DEXTROSE 50 % IN WATER 50 %
25 SYRINGE (ML) INTRAVENOUS ONCE
Refills: 0 | Status: COMPLETED | OUTPATIENT
Start: 2019-05-24 | End: 2019-05-24

## 2019-05-24 RX ORDER — ATORVASTATIN CALCIUM 80 MG/1
1 TABLET, FILM COATED ORAL
Qty: 0 | Refills: 0 | DISCHARGE

## 2019-05-24 RX ADMIN — Medication 25 MILLILITER(S): at 11:32

## 2019-05-24 RX ADMIN — Medication 10 MILLIGRAM(S): at 11:01

## 2019-05-24 RX ADMIN — ENOXAPARIN SODIUM 100 MILLIGRAM(S): 100 INJECTION SUBCUTANEOUS at 07:02

## 2019-05-24 RX ADMIN — METHOTREXATE 2.5 MILLIGRAM(S): 2.5 TABLET ORAL at 06:08

## 2019-05-24 NOTE — OCCUPATIONAL THERAPY INITIAL EVALUATION ADULT - MANUAL MUSCLE TESTING RESULTS, REHAB EVAL
left upper extremity 5/5 throughout; right upper extremity shoulder flexion/extension 4/5, elbow flex/ext 4+/5, pincer grasp 4+/5

## 2019-05-24 NOTE — OCCUPATIONAL THERAPY INITIAL EVALUATION ADULT - NS ASR FOLLOW COMMAND OT EVAL
100% of the time/mild difficulty replicating gestures and performing complex gestures on verbal commands, able to use objects properly otherwise; word finding difficulty with hesitations++ and paraphasias. Decreased L/R distinction./aphasia/oral apraxia

## 2019-05-24 NOTE — OCCUPATIONAL THERAPY INITIAL EVALUATION ADULT - ADDITIONAL COMMENTS
Patient reports assuming instrumental activities of daily living by herself. She is having medication dispill delivered to her house, she cooks meals herself and get groceries with cart she pushes to/from store.

## 2019-05-24 NOTE — PHYSICAL THERAPY INITIAL EVALUATION ADULT - GROSSLY INTACT, SENSORY
**Pt intact to all light touch sensation testing b/l UE and LE, however intermittently reporting incorrect side of body; anticipate this is due to word finding difficulty; pt able to correct herself with cueing

## 2019-05-24 NOTE — PROGRESS NOTE ADULT - PROBLEM SELECTOR PLAN 2
Pt with hx of Afib diagnosed during stroke work up March 2018. Pt take metoprolol  Tartrate 50mg BID and Eliquis 5mg BID and has been compliant with all meds.   - hold metoprolol 50mg BID given bradycardia (asymptomatic)   - changed eliquis to lovenox   - monitor HR

## 2019-05-24 NOTE — PROGRESS NOTE ADULT - PROBLEM SELECTOR PLAN 4
hx of HTN. Pt takes Metoprolol tartrate 50mg BID and Lasix 40mg daily. Unclear of why pt takes lasix, no known hx of HF, no edema on exam. Pt sees cardiologist Dr. Maradiaga  - tania collateral in AM  - hold lopressor   - hold lasix for permissive HTN  - f/up BIBIANA

## 2019-05-24 NOTE — DISCHARGE NOTE PROVIDER - NSDCCPCAREPLAN_GEN_ALL_CORE_FT
PRINCIPAL DISCHARGE DIAGNOSIS  Diagnosis: Slurred speech  Assessment and Plan of Treatment: You came to the hospital because of some word finding difficulty. Your imaging of the head showed new strokes in the corona radiata region on the left side of your brain which may be causing you to have trouble finding words. We did an echocardiogram study of your heart which did not show any holes in the walls of your heart. We started you on atorvastatin for stroke prevention. In place of your regular home Eliquis, we briefly gave you lovenox instead. We held your beta blocker, metoprolol, because your heart rate was low. You are now stable for discharge home.      SECONDARY DISCHARGE DIAGNOSES  Diagnosis: Cerebrovascular accident (CVA) due to other mechanism  Assessment and Plan of Treatment: PRINCIPAL DISCHARGE DIAGNOSIS  Diagnosis: Slurred speech  Assessment and Plan of Treatment: You came to the hospital because of some word finding difficulty. Your imaging of the head showed new strokes in the corona radiata region on the left side of your brain which may be causing you to have trouble finding words. We did an echocardiogram study of your heart which did not show any holes in the walls of your heart. We started you on atorvastatin for stroke prevention. You should continue with Eliquis. We held your beta blocker, metoprolol, because your heart rate was low. Please stop taking this medication and follow-up with your cardioloigst for further instructions. You are now stable for discharge home.      SECONDARY DISCHARGE DIAGNOSES  Diagnosis: Cerebrovascular accident (CVA) due to other mechanism  Assessment and Plan of Treatment: See plan above.

## 2019-05-24 NOTE — OCCUPATIONAL THERAPY INITIAL EVALUATION ADULT - GENERAL OBSERVATIONS, REHAB EVAL
Right hand dominant. Patient cleared for Occupational Therapy by Dr. Gary and RN Mckinley, patient received supine in non-acute distress. +Tele, +IV heplock, +bed alarm. Patient denies pain.

## 2019-05-24 NOTE — PROGRESS NOTE ADULT - ASSESSMENT
73y Female w/ PMH left MCA CVA s/p thrombectomy in March 2018 at [facility] (with some residual right arm weakness and some aphasia, afib on eliquis, coming in with worsening speech difficulty for one week.

## 2019-05-24 NOTE — DISCHARGE NOTE NURSING/CASE MANAGEMENT/SOCIAL WORK - NSDCDPATPORTLINK_GEN_ALL_CORE
You can access the [facility] Patient Portal, offered by [facility], by registering with the following website: [email or web address]

## 2019-05-24 NOTE — OCCUPATIONAL THERAPY INITIAL EVALUATION ADULT - RANGE OF MOTION EXAMINATION, UPPER EXTREMITY
Left UE Active ROM was WFL (within functional limits)/right upper extremity weakness with contracture right digits III-IV-V

## 2019-05-24 NOTE — PHYSICAL THERAPY INITIAL EVALUATION ADULT - PERTINENT HX OF CURRENT PROBLEM, REHAB EVAL
73y Female w/ PMH left MCA CVA s/p thrombectomy in March 2018 at [facility] (with some residual right arm weakness and some aphasia, afib on eliquis, coming in with worsening speech difficulty for one week.

## 2019-05-24 NOTE — DISCHARGE NOTE PROVIDER - CARE PROVIDER_API CALL
Naomi Adams)  Neurology; Vascular Neurology  [address], [address] [place] [address]  Phone: [phone]  Fax: [phone]  Follow Up Time:

## 2019-05-24 NOTE — CONSULT NOTE ADULT - ASSESSMENT
73y Female w/ PMH left MCA CVA s/p thrombectomy in March 2018 at [facility] (with some residual right arm weakness and some aphasia, afib on eliquis, coming in with worsening speech difficulty for one week.     Problem/Plan - 1:  ·  Problem: Cerebrovascular accident (CVA) due to other mechanism.  Plan: Pt presents with worsening speech difficulty for one week. CTH/CTA read as L frontal/basal ganglia/corona radiata infarcts. Per neuro attending possible left acute on chronic MCA infarct.   - hold home eliquis and switch to lovenox.  - atorvastatin 40mg   - f/u BIBIANA    Problem/Plan - 2:  ·  Problem: Atrial fibrillation.  Plan: Pt with hx of Afib diagnosed during stroke work up March 2018. Pt take metoprolol  Tartrate 50mg BID and Eliquis 5mg BID and has been compliant with all meds.   - c/w metoprolol 50mg BID  - change eliquis to lovenox   - monitor HR.     Problem/Plan - 3:  ·  Problem: Rheumatoid arthritis.  Plan: Pt with hx of RA, well controlled per pt. Pt takes methotrexate 2.5mg every 2 weeks.  Last saw rheumatologist one month ago but is unsure of name. s/p b/l knee replacements  - obtain collateral.   - c/w methotrexate 2.5mg q 2 weeks (next dose tomm 5/24).     Problem/Plan - 4:  ·  Problem: HTN (hypertension).  Plan: Pt with hx of HTN. Pt takes Metoprolol tartrate 50mg BID and Lasix 40mg daily. Unclear of why pt takes lasix, no known hx of HF, no edema on exam. Pt sees cardiologist Dr. Maradiaga  - obtain collateral in AM  - c/w lopressor   - hold lasix for permissive HTN  - f/up BIBIANA.

## 2019-05-24 NOTE — PROGRESS NOTE ADULT - PROBLEM SELECTOR PLAN 3
Pt with hx of RA, well controlled per pt. Pt takes methotrexate 2.5mg every 2 weeks.  Last saw rheumatologist one month ago but is unsure of name. s/p b/l knee replacements  - obtain collateral.   - c/w methotrexate 2.5mg q 2 weeks (next dose today 5/24)

## 2019-05-24 NOTE — PHYSICAL THERAPY INITIAL EVALUATION ADULT - MANUAL MUSCLE TESTING RESULTS, REHAB EVAL
Refer to OT IE for UE MMT. LLE 5/5 throughout. RLE 5/5 throughout with exception of hip flex 4/5 and knee flex 4+/5.

## 2019-05-24 NOTE — CONSULT NOTE ADULT - SUBJECTIVE AND OBJECTIVE BOX
Patient is a 73y old  Female who presents with a chief complaint of stroke (23 May 2019 19:43)       HPI:  A 74yo F with PMH of L MCA CVA s/p thrombectomy in 2018 at [facility] (with residual right arm weakness and aphasia), AFib on Eliquis, HLD, and RA presents with worsening speech difficulty for one week. Per pt, she has noticed more word finding dificulty from her baseline. Pt called Dr. Verdin office today and was told to come to the ED. Pt has residual right sided weakness from prior stroke. She noticed no change from baseline. Pt denies new weakness, numbness/tingling, headache, fevers chills, nausea/vomiting, abdominal pain.     In ED VS: T 97.6, HR 60, /80, RR 16, SpO2 100 on RA. Labs s/f Pt 14.1, INR 1.24, normal cbc/bnp, CTH/CTA read as L frontal/basal ganglia/corona radiata infarcts. Per neuro attending possible left acute on chronic MCA infarct.  Pt admitted to [facility] for further management (23 May 2019 19:43)      PAST MEDICAL & SURGICAL HISTORY:  HTN (hypertension)  Rheumatoid arthritis  Atrial fibrillation      MEDICATIONS  (STANDING):  atorvastatin 40 milliGRAM(s) Oral at bedtime  enoxaparin Injectable 100 milliGRAM(s) SubCutaneous every 12 hours  FLUoxetine 10 milliGRAM(s) Oral daily    MEDICATIONS  (PRN):      Social History:    Functional Level Prior to Admission:    FAMILY HISTORY:      CBC Full  -  ( 23 May 2019 17:03 )  WBC Count : 4.96 K/uL  RBC Count : 4.01 M/uL  Hemoglobin : 12.5 g/dL  Hematocrit : 38.4 %  Platelet Count - Automated : 160 K/uL  Mean Cell Volume : 95.8 fl  Mean Cell Hemoglobin : 31.2 pg  Mean Cell Hemoglobin Concentration : 32.6 gm/dL  Auto Neutrophil # : 2.29 K/uL  Auto Lymphocyte # : 2.09 K/uL  Auto Monocyte # : 0.47 K/uL  Auto Eosinophil # : 0.08 K/uL  Auto Basophil # : 0.02 K/uL  Auto Neutrophil % : 46.2 %  Auto Lymphocyte % : 42.1 %  Auto Monocyte % : 9.5 %  Auto Eosinophil % : 1.6 %  Auto Basophil % : 0.4 %          143  |  105  |  22  ----------------------------<  89  4.3   |  28  |  0.84    Ca    9.5      23 May 2019 17:03    TPro  7.7  /  Alb  3.7  /  TBili  0.7  /  DBili  x   /  AST  20  /  ALT  17  /  AlkPhos  93        Urinalysis Basic - ( 23 May 2019 18:37 )    Color: Yellow / Appearance: Clear / S.010 / pH: x  Gluc: x / Ketone: NEGATIVE  / Bili: Negative / Urobili: 1.0 E.U./dL   Blood: x / Protein: Trace mg/dL / Nitrite: NEGATIVE   Leuk Esterase: Trace / RBC: < 5 /HPF / WBC < 5 /HPF   Sq Epi: x / Non Sq Epi: 0-5 /HPF / Bacteria: Present /HPF          Radiology:    < from: CT Head No Cont (19 @ 18:32) >    EXAM:  CT BRAIN                          PROCEDURE DATE:  2019          INTERPRETATION:  PROCEDURE: CT brain without intravenous contrast    INDICATION: Slurred speech; rule out CVA    TECHNIQUE: Multiple axial images were obtained at 5 mm intervals from the   skull base to the vertex. Sagittal and coronal reformatted images were   obtained from the axial data set. The images were reviewed in brain and   bone windows.    COMPARISON: CT brain 2018    FINDINGS: The CT examination demonstrates the age appropriate volume   loss. There is no midline shift or extra axial collections. There is a   slitlike cavity within the left basal ganglia extending to the corona   radiata which may be secondary to previous infarct and/or hemorrhage.  There is mild ex vacuo dilatation of the left frontal horn. There are   encephalomalacia changes within the left frontal lobe which may be   secondary to previous left MCA territory infarct. The gray white   differentiation appears within normal limits. There is no intracranial   hemorrhage or acute transcortical infarct. The bony windows demonstrates   no fractures. There has been interval right cataract surgery. The   visualized paranasal sinuses are within normal limits. The mastoid air   cells are well aerated.    IMPRESSION: No intracranial hemorrhage or acute transcortical infarct.   Old left frontal and left basal ganglia/corona radiata infarcts.        < from: CT Angio Head w/ IV Cont (19 @ 18:33) >  EXAM:  CT ANGIO BRAIN (W)AW IC                          EXAM:  CT ANGIO NECK (W)AW IC                          PROCEDURE DATE:  2019          INTERPRETATION:  I, Kamlesh Lezama MD, have reviewed the images and   the report and agree with the findings.    PROCEDURE: CTA brain with intravenous contrast.    INDICATION: Slurred speech. Stroke code.    TECHNIQUE: Multiple axial thin section were obtained through the [place]   [place] following the intravenous bolus injection of 85 mL of  Optiray-350. MIP reformats were created.      COMPARISON: None.    FINDINGS: The CTA exam of the [place] demonstrates the internal   carotid arteries to be normal in caliber. There is a normal bifurcation   into the A1 and M1 segments, bilaterally. The visualized vertebral   arteries appear normal in caliber. The basilar artery appears normal. The   right P1 segment is patent. The left P1 segment is aplastic, with fetal   supply from the left posterior communicating artery. There are no areas   of stenosis, dilatation or aneurysm.    IMPRESSION: No large vessel occlusion or high-grade stenosis.      PROCEDURE: CTA neck with intravenous contrast.    INDICATION: As above    TECHNIQUE: Multiple axial thin section were obtained throughthe neck   following the intravenous bolus injection of contrast material, as above.   MIP series provided.    COMPARISON: None.    FINDINGS: The CTA exam demonstrates the right common carotid artery to be   normal in caliber.  There is a normal bifurcation into the right internal   and external carotid arteries. There is no hemodynamically significant   stenosis.      The left common carotid artery is normal in caliber.  There is a normal   bifurcation into the left internal and external carotidarteries.  There   is no hemodynamically significant stenosis.     The visualized vertebral arteries are normal in caliber.    The aortic arch appears intact without narrowing of the origin of the   great vessels.    There is a 9 mm hypodense nodule in the left thyroid lobe, for which   follow-up is not necessary.    Impression: No steno-occlusive disease in the carotid or vertebral   arteries in the neck.        < from: MR Head No Cont (19 @ 22:51) >  EXAM:  MR BRAIN                          PROCEDURE DATE:  2019          INTERPRETATION:  I, Antoinette Mtz MD, have reviewed the images and the   report and agree with the findings, with the following modification:     There is a large geographic area encephalomalacia changes within the left   posterior frontal and parietal lobe compatible with old left MCA   territory infarct. There are cortical linear areas of hyperintense signal   on the GRE images with a 5 mm nodular focus within theposterior left   frontal lobe (series 10 image 23) compatible with hemorrhagic   transformation of the infarct. There are additional smaller focus of   encephalomalacia changes within the high right parietal lobe compatible   with additional old infarct. There is a slitlike cavity within the left   basal ganglia extending to the corona radiata which demonstrates   hypointense signal on the GRE images compatible with old basal   ganglia/corona radiata hemorrhagic infarct.    The diffusion-weightedimages demonstrate 3 punctate foci of hyperintense   signal with minimum corresponding hypointense signal on the ADC maps   within the left frontal subcortical white matter (series 6 image 55) and   within the corona radiata series 6 image 53). Thesemay represent   acute/subacute lacunar infarcts.    There is age-appropriate volume loss.    ******PRELIMINARY REPORT******     PROCEDURE: MRI Brain without contrast    INDICATION: Patient initially presented with right-sided weakness now   with new slurred speech.    TECHNIQUE: Sagittal T1, axial T1, T2, FLAIR, diffusion, GRE and coronal   FLAIR images of the brain were obtained.    COMPARISON: CT head from 2019.    FINDINGS: The MRI examination of the brain demonstrates the ventricles,   cisternal spaces, and cortical sulci to be appropriate for the patient's   stated age. There is mild ex vacuo dilatation of the left frontal horn of   the lateral ventricle. There is no midline shift or extra-axial fluid   collection.     There is a punctate region of diffusion restriction in the left   precentral gyrus and in the left corona radiata with associated reduction   on the ADC map compatible with acute lacunar infarctions. There are   additional regions of high intensity in the left corona radiata without   T2 shine through, which may represent subacute infarctions. There are   encephalomalacic/gliotic changes in the left frontoparietal lobes. There   is a cyst like cavity in the left basal ganglia extending to the corona   radiata with encephalomalacic/gliotic changes. There is moderate   periventricular and subcortical T2/FLAIR hyperintensity compatible with   chronic microvascular ischemic change.    The GRE images demonstrate blooming in the left central sulcus and left   basal ganglia. The vascular flow voids are preserved. The visualized   paranasal sinuses are free of mucosal disease. The mastoid air cells are   well-aerated.    IMPRESSION:   Acute lacunar infarctions left precentral gyrus and left corona radiata.   Subacute lacunar infarctions left corona radiata. Chronic infarctions   left frontoparietal lobes and basal ganglia.         Vital Signs Last 24 Hrs  T(C): 36.6 (24 May 2019 09:46), Max: 36.7 (23 May 2019 19:00)  T(F): 97.8 (24 May 2019 09:46), Max: 98.1 (23 May 2019 19:00)  HR: 46 (24 May 2019 09:22) (46 - 62)  BP: 164/67 (24 May 2019 09:22) (122/66 - 168/71)  BP(mean): 86 (24 May 2019 09:22) (83 - 97)  RR: 16 (24 May 2019 09:22) (16 - 18)  SpO2: 100% (24 May 2019 09:22) (98% - 100%)    REVIEW OF SYSTEMS:    CONSTITUTIONAL: No fever, weight loss, or fatigue  EYES: No eye pain, visual disturbances, or discharge  ENMT:  No difficulty hearing, tinnitus, vertigo; No sinus or throat pain  NECK: No pain or stiffness  BREASTS: No pain, masses, or nipple discharge  RESPIRATORY: No cough, wheezing, chills or hemoptysis; No shortness of breath  CARDIOVASCULAR: No chest pain, palpitations, dizziness, or leg swelling  GASTROINTESTINAL: No abdominal or epigastric pain. No nausea, vomiting, or hematemesis; No diarrhea or constipation. No melena or hematochezia.  GENITOURINARY: No dysuria, frequency, hematuria, or incontinence  NEUROLOGICAL: No headaches, memory loss, loss of strength, numbness, or tremors  SKIN: No itching, burning, rashes, or lesions   LYMPH NODES: No enlarged glands  ENDOCRINE: No heat or cold intolerance; No hair loss  MUSCULOSKELETAL: No joint pain or swelling; No muscle, back, or extremity pain  PSYCHIATRIC: No depression, anxiety, mood swings, or difficulty sleeping  HEME/LYMPH: No easy bruising, or bleeding gums  ALLERGY AND IMMUNOLOGIC: No hives or eczema  VASCULAR: no swelling, erythema      Physical Exam: frail 72 yo AA woman lying in semi Pearce's position, c/o difficulty talking    Head: normocephalic, atraumatic    Eyes: PERRLA, EOMI, no nystagmus, sclera anicteric    ENT: nasal discharge, uvula midline, no oropharyngeal erythema/exudate    Neck: supple, negative JVD, negative carotid bruits, no thyromegaly    Chest: CTA bilaterally, neg wheeze, rhonchi, rales, crackles, egophany    Cardiovascular: regular rate and rhythm, neg murmurs/rubs/gallops    Abdomen: soft, non distended, non tender, negative rebound/guarding, normal bowel sounds, neg hepatosplenomegaly    Extremities: WWP, neg cyanosis/clubbing/edema, negative calf tenderness to palpation, negative Austen's sign    :     Neurologic Exam:    Alert and oriented to person, place, date/year, speech fluent w/ expressive aphasia    Cranial Nerves:     II:                       pupils equal, round and reactive to light, visual fields intact   III/ IV/VI:             extraocular movements intact, neg nystagmus, neg ptosis  V:                       facial sensation intact, V1-3 normal  VII:                     face symmetric, no droop, normal eye closure and smile  VIII:                    hearing intact to finger rub bilaterally  IX/ X:                 soft palate rise symmetrical  XI:                      head turning, shoulder shrug normal  XII:                     tongue midline    Motor Exam:    Upper Extremities:     RIght:   4-/5   /intrinsics               4-/5  wrist flexors/extensors               4-/5  biceps/triceps               4-/5  deltoid               pos drift    Left :     5/5  /intrinsics               5/5  wrist flexors/extensors               5/5 biceps/triceps               5/5 deltoid               negative drift    Lower Extremities:                 Right:     5/5  DF/PF/ evertors/ invertors                5/5  Quad/ hamstrings                5/5  hip flexors/adductors/abductors                 Left:       4/5  DF/PF/ evertors/ invertors                4-/5  Quad/ hamstrings                4-/5  hip flexors/adductors/abductors                       Sensory:    intact to LT/PP in all UE/LE dermatomes    DTR:            = biceps/     triceps/     brachioradialis                      = patella/   medial hamstring/ankle                      neg clonus                      neg Babinski                        Gait:  not tested        PM&R Impression:    1) s/p acute lacunar infarctions left precentral gyrus and left corona radiata, subacute lacunar infarctions left corona radiata        Recommendations:    1) Physical therapy focusing on therapeutic exercises, bed mobility/transfer out of bed evaluation, progressive ambulation with assistive devices prn.    2) Anticipated Disposition Plan/Recs: pending functional progress

## 2019-05-24 NOTE — OCCUPATIONAL THERAPY INITIAL EVALUATION ADULT - PERTINENT HX OF CURRENT PROBLEM, REHAB EVAL
73y Female w/ PMH left MCA CVA s/p thrombectomy in March 2018 at [facility] (with some residual right arm weakness and some aphasia, afib on eliquis, coming in with worsening speech difficulty for one week. CTH/CTA read as L frontal/basal ganglia/corona radiata infarcts. MRI shows two new infarcts in the L. corona radiata.

## 2019-05-24 NOTE — PROGRESS NOTE ADULT - PROBLEM SELECTOR PLAN 1
Pt presents with worsening speech difficulty for one week. CTH/CTA read as L frontal/basal ganglia/corona radiata infarcts. MRI shows two new infarcts in the L. corona radiata.   - hold home eliquis and switch to lovenox.  - atorvastatin 40mg   - PT/OT  - Speech therapy   - f/up BIBIANA

## 2019-05-24 NOTE — DISCHARGE NOTE PROVIDER - NSDCFUADDAPPT_GEN_ALL_CORE_FT
The office will call you on Tuesday to schedule an appointment with Dr. Adams for stroke follow-up. At that time they will let you know if it is safe to proceed with Botox on May 31st.

## 2019-05-24 NOTE — PROGRESS NOTE ADULT - SUBJECTIVE AND OBJECTIVE BOX
INTERVAL HPI/OVERNIGHT EVENTS:  O/N metoprolol was held given that patient was bradycardic to the 40s while sleeping. MRI showed 2 acute strokes in the L. corona radiata.     This morning, the patient was seen and examined at bedside. She continues to have some trouble forming words. Denies new weakness/sensory loss/headache/visual changes. ROS is otherwise negative.     ICU Vital Signs Last 24 Hrs  T(C): 36.6 (24 May 2019 09:46), Max: 36.7 (23 May 2019 19:00)  T(F): 97.8 (24 May 2019 09:46), Max: 98.1 (23 May 2019 19:00)  HR: 46 (24 May 2019 09:22) (46 - 62)  BP: 164/67 (24 May 2019 09:22) (122/66 - 168/71)  BP(mean): 86 (24 May 2019 09:22) (83 - 97)  RR: 16 (24 May 2019 09:22) (16 - 18)  SpO2: 100% (24 May 2019 09:22) (98% - 100%)    19 @ 07:01  -  19 @ 07:00  --------------------------------------------------------  IN: 240 mL / OUT: 0 mL / NET: 240 mL    PHYSICAL EXAM:    Constitutional: Elderly female in NAD.   Eyes: PERRL, EOMI, anicteric sclera  Neck: supple; no JVD or thyromegaly  Respiratory: CTA B/L  Gastrointestinal: soft, NT/ND; no rebound or guarding; +BSx4  Back: spine midline, no bony tenderness or step-offs; no CVAT B/L  Extremities: WWP, no clubbing or cyanosis; no peripheral edema  Musculoskeletal: RUE 3/5 MS. Right MCP joint swelling with swan neck deformity and   Neurologic: AAOx3; MS RUE 3/5, LUE 5/5 and LE 5/5 b/l. Sensation intact. +Aphasia/word finding difficulty. Neg finger to nose.    MEDICATIONS  (STANDING):  atorvastatin 40 milliGRAM(s) Oral at bedtime  enoxaparin Injectable 100 milliGRAM(s) SubCutaneous every 12 hours  FLUoxetine 10 milliGRAM(s) Oral daily    MEDICATIONS  (PRN):      Allergies    No Known Allergies    Intolerances        LABS:                        12.5   4.96  )-----------( 160      ( 23 May 2019 17:03 )             38.4         143  |  105  |  22  ----------------------------<  89  4.3   |  28  |  0.84    Ca    9.5      23 May 2019 17:03    TPro  7.7  /  Alb  3.7  /  TBili  0.7  /  DBili  x   /  AST  20  /  ALT  17  /  AlkPhos  93      PT/INR - ( 23 May 2019 17:03 )   PT: 14.1 sec;   INR: 1.24          PTT - ( 23 May 2019 17:03 )  PTT:31.3 sec  Urinalysis Basic - ( 23 May 2019 18:37 )    Color: Yellow / Appearance: Clear / S.010 / pH: x  Gluc: x / Ketone: NEGATIVE  / Bili: Negative / Urobili: 1.0 E.U./dL   Blood: x / Protein: Trace mg/dL / Nitrite: NEGATIVE   Leuk Esterase: Trace / RBC: < 5 /HPF / WBC < 5 /HPF   Sq Epi: x / Non Sq Epi: 0-5 /HPF / Bacteria: Present /HPF        RADIOLOGY & ADDITIONAL TESTS:  Reviewed

## 2019-05-24 NOTE — OCCUPATIONAL THERAPY INITIAL EVALUATION ADULT - COORDINATION ASSESSED, REHAB EVAL
grossly intact left upper extremity, decreased speed right upper extremity no dysmetria/finger to nose

## 2019-05-24 NOTE — DISCHARGE NOTE NURSING/CASE MANAGEMENT/SOCIAL WORK - NSDCPEPTSTRK_GEN_ALL_CORE
Stroke support groups for patients, families, and friends/Call 911 for stroke/Risk factors for stroke/Stroke education booklet/Stroke warning signs and symptoms/Signs and symptoms of stroke/Need for follow up after discharge/Prescribed medications

## 2019-05-24 NOTE — SPEECH LANGUAGE PATHOLOGY EVALUATION - SLP DIAGNOSIS
Pt presents with mild expressive language deficits impacting communication at the conversation level. Dysfluencies also noted during structured reading tasks. Receptive language skills are intact. Pt would benefit from skilled speech and lang. tx in acute rehab setting.

## 2019-05-24 NOTE — DISCHARGE NOTE PROVIDER - HOSPITAL COURSE
74yo F with PMH of L MCA CVA s/p thrombectomy in March 2018 at [facility] (with residual right arm weakness and aphasia), AFib on Eliquis, HLD, and RA presented with worsening speech difficulty for one week.  Pt has residual right sided weakness from prior stroke. In ED VS: T 97.6, HR 60, /80, RR 16, SpO2 100 on RA. Labs s/f Pt 14.1, INR 1.24, normal cbc/bnp, CTH/CTA read as L frontal/basal ganglia/corona radiata infarcts. MRI showed L. corona radiata lacunar infarcts of different ages both acute on diffusion imaging. Patient was bradycardic overnight to the 40s and metoprolol was held. BIBIANA showed no clot, a dilated RA and small late bubbles suggesting small pulmonary AVM. Patient received her regular home methotrexate today. Eliquis was held and patient was given lovenox in place. She is now hemodynamically stable for discharge home. 72yo F with PMH of L MCA CVA s/p thrombectomy in March 2018 at [facility] (with residual right arm weakness and aphasia), AFib on Eliquis, HLD, and RA presented with worsening speech difficulty for one week.  Pt has residual right sided weakness from prior stroke. In ED VS: T 97.6, HR 60, /80, RR 16, SpO2 100 on RA. Labs s/f Pt 14.1, INR 1.24, normal cbc/bnp, CTH/CTA read as L frontal/basal ganglia/corona radiata infarcts. MRI showed L. corona radiata lacunar infarcts of different ages both acute on diffusion imaging. Patient was bradycardic overnight to the 40s and metoprolol was held. BIBIANA showed no clot, a dilated RA and small late bubbles suggesting small pulmonary AVM. Patient received her regular home methotrexate today. Eliquis was held and patient was given lovenox in place. She is now hemodynamically stable for discharge home. Patient will resume Eliquis and follow-up with neurology.

## 2019-05-24 NOTE — PHYSICAL THERAPY INITIAL EVALUATION ADULT - ACTIVE RANGE OF MOTION EXAMINATION, REHAB EVAL
RUE AROM WFL with exception of R hand with inc finger flexion (pt reports this is baseline from prior CVA)/bilateral  lower extremity Active ROM was WFL (within functional limits)/Left UE Active ROM was WFL (within functional limits)

## 2019-05-24 NOTE — PROGRESS NOTE ADULT - PROBLEM SELECTOR PLAN 7
1) PCP Contacted on Admission: (Y/N) --> Name & Phone #: Dr. Douglass [phone]   2) Date of Contact with PCP:  3) PCP Contacted at Discharge: (Y/N, N/A)  4) Summary of Handoff Given to PCP:   5) Post-Discharge Appointment Date and Location:

## 2019-05-24 NOTE — SPEECH LANGUAGE PATHOLOGY EVALUATION - COMMENTS
Per MRI, "Acute lacunar infarctions left precentral gyrus and left corona radiata. Subacute lacunar infarctions left corona radiata. Chronic infarctions   left frontoparietal lobes and basal ganglia." WH- questions (biographical/orientation): 100%; Object function: 100%; Convergent namin%    Pt presents with mild expressive language deficits marked by hesitations and self-corrected errors. Deficits were more noticeable during spontaneous speech tasks. Occasional dysfluencies while reading at the sentence level.

## 2019-05-24 NOTE — PHYSICAL THERAPY INITIAL EVALUATION ADULT - ADDITIONAL COMMENTS
Pt lives alone with no steps to enter her home. Ambulates with no DME inside the home, and a [place] when in the community. Pt states she owns RW and shower chair as well. Per pt report, she cooks, cleans, and grocery shops independently (uses a cart to walk to the grocery store.)

## 2019-05-28 ENCOUNTER — INBOUND DOCUMENT (OUTPATIENT)
Age: 74
End: 2019-05-28

## 2019-05-29 DIAGNOSIS — Z79.01 LONG TERM (CURRENT) USE OF ANTICOAGULANTS: ICD-10-CM

## 2019-05-29 DIAGNOSIS — I69.931 MONOPLEGIA OF UPPER LIMB FOLLOWING UNSPECIFIED CEREBROVASCULAR DISEASE AFFECTING RIGHT DOMINANT SIDE: ICD-10-CM

## 2019-05-29 DIAGNOSIS — I48.91 UNSPECIFIED ATRIAL FIBRILLATION: ICD-10-CM

## 2019-05-29 DIAGNOSIS — M06.9 RHEUMATOID ARTHRITIS, UNSPECIFIED: ICD-10-CM

## 2019-05-29 DIAGNOSIS — Z96.653 PRESENCE OF ARTIFICIAL KNEE JOINT, BILATERAL: ICD-10-CM

## 2019-05-29 DIAGNOSIS — E78.5 HYPERLIPIDEMIA, UNSPECIFIED: ICD-10-CM

## 2019-05-29 DIAGNOSIS — I63.89 OTHER CEREBRAL INFARCTION: ICD-10-CM

## 2019-05-29 DIAGNOSIS — R00.1 BRADYCARDIA, UNSPECIFIED: ICD-10-CM

## 2019-05-29 DIAGNOSIS — R47.81 SLURRED SPEECH: ICD-10-CM

## 2019-05-31 ENCOUNTER — APPOINTMENT (OUTPATIENT)
Dept: NEUROLOGY | Facility: CLINIC | Age: 74
End: 2019-05-31

## 2019-05-31 PROBLEM — I10 ESSENTIAL (PRIMARY) HYPERTENSION: Chronic | Status: ACTIVE | Noted: 2019-05-23

## 2019-05-31 PROBLEM — M06.9 RHEUMATOID ARTHRITIS, UNSPECIFIED: Chronic | Status: ACTIVE | Noted: 2019-05-23

## 2019-05-31 PROBLEM — I48.91 UNSPECIFIED ATRIAL FIBRILLATION: Chronic | Status: ACTIVE | Noted: 2019-05-23

## 2019-06-03 ENCOUNTER — APPOINTMENT (OUTPATIENT)
Dept: NEUROLOGY | Facility: CLINIC | Age: 74
End: 2019-06-03
Payer: MEDICARE

## 2019-06-03 VITALS
DIASTOLIC BLOOD PRESSURE: 78 MMHG | OXYGEN SATURATION: 99 % | HEIGHT: 65 IN | HEART RATE: 68 BPM | TEMPERATURE: 98.5 F | WEIGHT: 229 LBS | BODY MASS INDEX: 38.15 KG/M2 | SYSTOLIC BLOOD PRESSURE: 150 MMHG

## 2019-06-03 DIAGNOSIS — G81.10 SPASTIC HEMIPLEGIA AFFECTING UNSPECIFIED SIDE: ICD-10-CM

## 2019-06-03 PROCEDURE — 64642 CHEMODENERV 1 EXTREMITY 1-4: CPT

## 2019-06-03 NOTE — PROCEDURE
[FreeTextEntry2] : spastic hemiplegia [FreeTextEntry1] : botox for spastic RUE [FreeTextEntry4] : ethylchloride [FreeTextEntry3] : Patient was consented with explanation of risks and benefits including black box warnings and explanation of alternative treatments. \par \par Arm  positioned to expose injection sites and prepped with alcohol and ethyl chloride. \par \par R Flexor Digitorum Superficialis 50 units\par R  Flexor Digitorum Profundus  50 units\par lumbricals 50 units\par APB 30 units\par \par \par \par \par Total used 180 units Onabotulinum toxin\par Total wasted = 20\par Total billable = 200 units\par \par Patient tolerated procedure well\par

## 2019-06-06 ENCOUNTER — MEDICATION RENEWAL (OUTPATIENT)
Age: 74
End: 2019-06-06

## 2019-06-11 ENCOUNTER — NON-APPOINTMENT (OUTPATIENT)
Age: 74
End: 2019-06-11

## 2019-06-11 ENCOUNTER — APPOINTMENT (OUTPATIENT)
Dept: HEART AND VASCULAR | Facility: CLINIC | Age: 74
End: 2019-06-11
Payer: MEDICARE

## 2019-06-11 VITALS
SYSTOLIC BLOOD PRESSURE: 132 MMHG | OXYGEN SATURATION: 98 % | BODY MASS INDEX: 38.15 KG/M2 | DIASTOLIC BLOOD PRESSURE: 80 MMHG | HEIGHT: 65 IN | HEART RATE: 60 BPM | WEIGHT: 229 LBS

## 2019-06-11 DIAGNOSIS — I07.1 RHEUMATIC TRICUSPID INSUFFICIENCY: ICD-10-CM

## 2019-06-11 PROCEDURE — 93306 TTE W/DOPPLER COMPLETE: CPT

## 2019-06-11 PROCEDURE — 99214 OFFICE O/P EST MOD 30 MIN: CPT | Mod: 25

## 2019-06-11 PROCEDURE — 93000 ELECTROCARDIOGRAM COMPLETE: CPT

## 2019-06-11 NOTE — HISTORY OF PRESENT ILLNESS
[FreeTextEntry1] : pt hospitalized with slurred speech and told of stroke- her BB were discontinued and she was told to increase Lipitor to 40mg( she hasn't done either)- no cp,sob,palpitations

## 2019-06-11 NOTE — DISCUSSION/SUMMARY
[FreeTextEntry1] : EKG:AF\par cont BB +Lasix for dCHF, BB + Eliquis for AF ,lipitor for lipids( increase to 40mg)\par echo; normal LVEf, TR\par meds increased

## 2019-06-11 NOTE — PHYSICAL EXAM
[General Appearance - Well Developed] : well developed [Normal Appearance] : normal appearance [Well Groomed] : well groomed [General Appearance - Well Nourished] : well nourished [No Deformities] : no deformities [General Appearance - In No Acute Distress] : no acute distress [Eyelids - No Xanthelasma] : the eyelids demonstrated no xanthelasmas [Normal Oral Mucosa] : normal oral mucosa [Normal Jugular Venous A Waves Present] : normal jugular venous A waves present [Normal Jugular Venous V Waves Present] : normal jugular venous V waves present [No Jugular Venous Omalley A Waves] : no jugular venous omalley A waves [] : no respiratory distress [Respiration, Rhythm And Depth] : normal respiratory rhythm and effort [Exaggerated Use Of Accessory Muscles For Inspiration] : no accessory muscle use [Auscultation Breath Sounds / Voice Sounds] : lungs were clear to auscultation bilaterally [Edema] : no peripheral edema present [Bowel Sounds] : normal bowel sounds [Abdomen Soft] : soft [Abdomen Tenderness] : non-tender [FreeTextEntry1] : using cane; right sided weakness [Skin Color & Pigmentation] : normal skin color and pigmentation [Nail Clubbing] : no clubbing of the fingernails [Oriented To Time, Place, And Person] : oriented to person, place, and time

## 2019-06-11 NOTE — REVIEW OF SYSTEMS
[Fever] : no fever [Headache] : no headache [Recent Weight Gain (___ Lbs)] : no recent weight gain [Chills] : no chills [Feeling Fatigued] : not feeling fatigued [Recent Weight Loss (___ Lbs)] : no recent weight loss [Blurry Vision] : no blurred vision [Eyeglasses] : currently wearing eyeglasses [Loss Of Hearing] : no hearing loss [Sore Throat] : no sore throat [Dysuria] : no dysuria [Joint Pain] : joint pain [Joint Swelling] : no joint swelling [Lower Back Pain] : lower back pain [see HPI] : see HPI [Dizziness] : no dizziness [Tremor] : no tremor was seen [Numbness (Hypesthesia)] : no numbness [Tingling (Paresthesia)] : no tingling [Confusion] : no confusion was observed [Memory Lapses Or Loss] : memory lapses or loss [Depression] : no depression [Anxiety] : no anxiety [Under Stress] : not under stress [Suicidal] : not suicidal [Negative] : Heme/Lymph

## 2019-08-19 ENCOUNTER — MEDICATION RENEWAL (OUTPATIENT)
Age: 74
End: 2019-08-19

## 2019-08-28 ENCOUNTER — APPOINTMENT (OUTPATIENT)
Dept: NEUROLOGY | Facility: CLINIC | Age: 74
End: 2019-08-28
Payer: MEDICARE

## 2019-08-28 ENCOUNTER — NON-APPOINTMENT (OUTPATIENT)
Age: 74
End: 2019-08-28

## 2019-08-28 VITALS
HEIGHT: 65 IN | OXYGEN SATURATION: 95 % | WEIGHT: 238 LBS | SYSTOLIC BLOOD PRESSURE: 117 MMHG | HEART RATE: 67 BPM | DIASTOLIC BLOOD PRESSURE: 70 MMHG | BODY MASS INDEX: 39.65 KG/M2

## 2019-08-28 DIAGNOSIS — I69.351 HEMIPLEGIA AND HEMIPARESIS FOLLOWING CEREBRAL INFARCTION AFFECTING RIGHT DOMINANT SIDE: ICD-10-CM

## 2019-08-28 DIAGNOSIS — I63.311 CEREBRAL INFARCTION DUE TO THROMBOSIS OF RIGHT MIDDLE CEREBRAL ARTERY: ICD-10-CM

## 2019-08-28 PROCEDURE — 93880 EXTRACRANIAL BILAT STUDY: CPT

## 2019-08-28 PROCEDURE — 99214 OFFICE O/P EST MOD 30 MIN: CPT

## 2019-08-28 RX ORDER — METHOTREXATE 2.5 MG/1
2.5 TABLET ORAL
Refills: 0 | Status: DISCONTINUED | COMMUNITY
End: 2019-08-28

## 2019-08-28 RX ORDER — FLUOXETINE HYDROCHLORIDE 20 MG/1
20 CAPSULE ORAL DAILY
Refills: 0 | Status: ACTIVE | COMMUNITY

## 2019-08-29 LAB
CHOLEST SERPL-MCNC: 115 MG/DL
CHOLEST/HDLC SERPL: 2 RATIO
HDLC SERPL-MCNC: 59 MG/DL
LDLC SERPL CALC-MCNC: 46 MG/DL
TRIGL SERPL-MCNC: 50 MG/DL

## 2019-08-29 NOTE — HISTORY OF PRESENT ILLNESS
[FreeTextEntry1] : 74 year-old RH female presents for follow up visit and post hospital f/u.. Patient had a stroke in March and then another one in June. \par \par She's feeling okay but still has speech and motor weakness.  Patient denies any dizziness, headaches, numbness, and tingling. \par \par Residual Stroke deficits - \par Speech - She still has trouble with production and pronunciation.  She completed speech therapy about 6 weeks ago and would like another referral.    \par Motor - She has restricted use of her right hand. She can use some of it but still has issues- she had her first session of Botox and says it didn't really help. \par - She has not followed up with Rheumatologist, Dr. Ramsey, for diagnosis of Rheumatoid Arthritis since her stroke in June 2019.\par Sensory - reasonable \par Vision - No change.\par \par Stroke risk factors - \par Atrial fibrillation - No palpitations.  She takes the Eliquis regularly.  No bleeding in urine or stool.  She follows up with Dr. Maradiaga every three months. Has an upcoming appointment in September 2019. \par HTN - controlled \par  Atorvastatin - No muscle pains or cramps.\par No CAD\par Never smoker

## 2019-08-29 NOTE — PHYSICAL EXAM
[FreeTextEntry1] : General: sitting on exam table comfortably, NAD\par CV: IRR	\par Extremities: 2+ radial pulses bilaterally\par - Still some limited ROM at her wrist, elbow and fingers and both of her knees\par - Ulnar deviation of her three right fingers with mild tone\par 			\par Neurological exam:	\par Mental status: AA&O x 3, fluent - able name simple objects but had some paraphasic errors, + dysarthria, able give full history of present and past events after some prompting, memory intact, normal attention span, fund of knowledge appropriate\par \par Cranial nerves: EOMI, VFF, facial sensation intact, no facial droop, tongue midline\par Motor: no pronator drift Right UE 4+/5, Right LE at least 4+/5, Left 5/5, mild tone at her right UE\par \par Sensory: Intact light touch bilaterally\par Reflexes: symmetric\par Cerebellar: FNF intact bilaterally\par Gait: steady but uses cane

## 2019-08-29 NOTE — DATA REVIEWED
[de-identified] : EXAM:  MR BRAIN            PROCEDURE DATE:  05/23/2019      INTERPRETATION:  I, Antoinette Mtz MD, have reviewed the images and the  report and agree with the findings, with the following modification:   There is a large geographic area encephalomalacia changes within the left  posterior frontal and parietal lobe compatible with old left MCA  territory infarct. There are cortical linear areas of hyperintense signal  on the GRE images with a 5 mm nodular focus within the posterior left  frontal lobe (series 10 image 23) compatible with hemorrhagic  transformation of the infarct. There are additional smaller focus of  encephalomalacia changes within the high right parietal lobe compatible  with additional old infarct. There is a slitlike cavity within the left  basal ganglia extending to the corona radiata which demonstrates  hypointense signal on the GRE images compatible with old basal  ganglia/corona radiata hemorrhagic infarct.  The diffusion-weighted images demonstrate 3 punctate foci of hyperintense  signal with minimum corresponding hypointense signal on the ADC maps  within the left frontal subcortical white matter (series 6 image 55) and  within the corona radiata series 6 image 53). These may represent  acute/subacute lacunar infarcts.  There is age-appropriate volume loss.  [de-identified] : \par

## 2019-08-29 NOTE — ASSESSMENT
[FreeTextEntry1] : 74 year-old RH female presents for follow up of stroke. Patient's neurological exam shows still spasticity of right arm/hand. Patient still has speech impediment- takes time for her form the words and annunciate. \par \par Plan:\par 1) Secondary stroke prevention - \par a) Continue Eliquis 5mg BID for anticoagulation\par b) Continue current dose of Atorvastatin 40mg for vessel protection since LDL at goal. Re-do labs as last labs were done in Feb/May\par c) Continue Fluoxetine for motor recovery\par \par 2) Stroke risk factors - \par a) Atrial fibrillation - Eliquis and rate control with Lopressor\par b) HTN - Continue antihypertensives\par c) Hyperlipidemia - see above\par \par 3) Further treatment - \par a) Encouraged exercise at home to maintain and build her strength and endurance. Provided PT and OT referrals and Exercise RX\par b) Ordered walker to assist her stability\par c) Encouraged continued Speech therapy at home including reading and speaking with friends.  \par d) Continue with botox to assess if she warrants any intervention for her right hand \par e)Put her in touch with  for home health aide. \par

## 2019-09-05 ENCOUNTER — APPOINTMENT (OUTPATIENT)
Dept: NEUROLOGY | Facility: CLINIC | Age: 74
End: 2019-09-05

## 2019-09-25 NOTE — H&P ADULT - NSHPPHYSICALEXAM_GEN_ALL_CORE
Pt is an 82 y/o F w/ a PMHx of dementia, HTN, PPM, recurrent C diff infxn (finished course of Vanco ~7 days ago) x 6 episodes this year so far, A fib (on Eliquis) who was sent to the ED from [facility]     # S/ P septic shock sec to UTI- E coli and Klebsiella  - s/p pressors  - Cont Rocephin  - Bld c/s neg  - off Midodrine now  - all her home BP meds restarted    # s/p Urinary retention, h/o neuromuscular bladder dysfxn  - Bladder scan Q6- Q 8 hrs and straight cath PRN  - pt was walked to the bathroom today and was able to void without difficulties and residual was 0.  - Bed bound at baseline  - maintain bowel regimen  - Resume home med Flomax    # Pneumo mediastinum  - appreciate CTS input  - sec to Tracheal perforation  - Will need Bronchoscopy for further evaluation and Rx. However family and HCP refusing invasive management     # Chronic C diff infection/ carrier  - currently no diarrhea    # HTN, HLD, Dementia  - Now hemodynamically stable off midodrine  - other BP meds restarted   - Cont Donepezil    # s/p BEN likely ATN on CKD   - resolved  - Avoid nephrotoxics  - Renally dose meds  - USG renal ruled out obstructive nephropathy    Unclear reason why pt is on Eliquis. V paced rhythm on EKG. Will cont same and check with NH.     Palliative team talking to family with [facility]. DNR/ DNI  per family- if no improvement in next few days to consider hospice Pt is an 82 y/o F w/ a PMHx of dementia, HTN, PPM, recurrent C diff infxn (finished course of Vanco ~7 days ago) x 6 episodes this year so far, A fib (on Eliquis) who was sent to the ED from [facility]     # S/ P septic shock sec to UTI- E coli and Klebsiella  - s/p pressors  - Cont Rocephin  - Bld c/s neg  - off Midodrine now  - all her home BP meds restarted    # s/p Urinary retention, h/o neuromuscular bladder dysfxn  - Bladder scan Q6- Q 8 hrs and straight cath PRN  - pt was walked to the bathroom today and was able to void without difficulties and residual was 0.  - Bed bound at baseline  - maintain bowel regimen  - Resume home med Flomax    # Pneumo mediastinum  - appreciate CTS input  - sec to Tracheal perforation  - Will need Bronchoscopy for further evaluation and Rx. However family and HCP refusing invasive management     # Chronic C diff infection/ carrier  - currently no diarrhea    # HTN, HLD, Dementia  - Now hemodynamically stable off midodrine  - other BP meds restarted   - Cont Donepezil    # s/p BEN likely ATN on CKD   - resolved  - Avoid nephrotoxics  - Renally dose meds  - [facility] renal ruled out obstructive nephropathy    Pt on Eliquis for h/o A fib s/p ablation. Currently V paced rhythm on EKG.    Palliative team talking to family with [facility]. DNR/ DNI  per family- if no improvement in next few days to consider hospice Pt is an 82 y/o F w/ a PMHx of dementia, HTN, PPM, recurrent C diff infxn (finished course of Vanco ~7 days ago) x 6 episodes this year so far, A fib (on Eliquis) who was sent to the ED from [facility]     # S/ P septic shock sec to UTI- E coli and Klebsiella  - s/p pressors  - Cont Rocephin  - Bld c/s neg  - off Midodrine now  - all her home BP meds restarted    # s/p Urinary retention, h/o neuromuscular bladder dysfxn  - Bladder scan Q6- Q 8 hrs and straight cath PRN  - pt was walked to the bathroom today and was able to void without difficulties and residual was 0.  - Bed bound at baseline  - maintain bowel regimen  - Resume home med Flomax    # Pneumo mediastinum  - appreciate CTS input  - sec to Tracheal perforation  - Will need Bronchoscopy for further evaluation and Rx. However family and HCP refusing invasive management     # Chronic C diff infection/ carrier  - currently no diarrhea    # HTN, HLD, Dementia  - Now hemodynamically stable off midodrine  - other BP meds restarted   - Cont Donepezil    # s/p BEN likely ATN on CKD   - resolved  - Avoid nephrotoxics  - Renally dose meds  - [facility]G renal ruled out obstructive nephropathy    Pt on Eliquis for h/o A fib s/p ablation. Currently V paced rhythm on EKG.    Palliative team talking to family with [facility]. DNR/ DNI  per family- if no improvement in next few days to consider hospice .  VITAL SIGNS:  T(C): 36.6 (05-23-19 @ 19:32), Max: 36.7 (05-23-19 @ 19:00)  T(F): 97.8 (05-23-19 @ 19:32), Max: 98.1 (05-23-19 @ 19:00)  HR: 56 (05-23-19 @ 19:35) (56 - 62)  BP: 159/63 (05-23-19 @ 19:35) (140/80 - 168/71)  BP(mean): 91 (05-23-19 @ 19:35) (91 - 91)  RR: 16 (05-23-19 @ 19:35) (16 - 16)  SpO2: 98% (05-23-19 @ 19:35) (98% - 100%)  Wt(kg): --    PHYSICAL EXAM:    Constitutional: WDWN resting comfortably in bed; NAD  Head: NC/AT  Eyes: PERRL, EOMI, anicteric sclera  ENT: no nasal discharge; uvula midline, no oropharyngeal erythema or exudates; MMM  Neck: supple; no JVD or thyromegaly  Respiratory: CTA B/L; no W/R/R, no retractions  Cardiac: +S1/S2; RRR; no M/R/G; PMI non-displaced  Gastrointestinal: soft, NT/ND; no rebound or guarding; +BSx4  Genitourinary: normal external genitalia  Back: spine midline, no bony tenderness or step-offs; no CVAT B/L  Extremities: WWP, no clubbing or cyanosis; no peripheral edema  Musculoskeletal: NROM x4; no joint swelling, tenderness or erythema  Vascular: 2+ radial, femoral, DP/PT pulses B/L  Dermatologic: skin warm, dry and intact; no rashes, wounds, or scars  Lymphatic: no submandibular or cervical LAD  Neurologic: AAOx3; CNII-XII grossly intact; no focal deficits  Psychiatric: affect and characteristics of appearance, verbalizations, behaviors are appropriate .  VITAL SIGNS:  T(C): 36.6 (05-23-19 @ 19:32), Max: 36.7 (05-23-19 @ 19:00)  T(F): 97.8 (05-23-19 @ 19:32), Max: 98.1 (05-23-19 @ 19:00)  HR: 56 (05-23-19 @ 19:35) (56 - 62)  BP: 159/63 (05-23-19 @ 19:35) (140/80 - 168/71)  BP(mean): 91 (05-23-19 @ 19:35) (91 - 91)  RR: 16 (05-23-19 @ 19:35) (16 - 16)  SpO2: 98% (05-23-19 @ 19:35) (98% - 100%)  Wt(kg): --    PHYSICAL EXAM:    Constitutional: Elderly female in NAD.   Eyes: PERRL, EOMI, anicteric sclera  Neck: supple; no JVD or thyromegaly  Respiratory: CTA B/L  Gastrointestinal: soft, NT/ND; no rebound or guarding; +BSx4  Back: spine midline, no bony tenderness or step-offs; no CVAT B/L  Extremities: WWP, no clubbing or cyanosis; no peripheral edema  Musculoskeletal: RUE 3/5 MS. Right MCP joint swelling with swan neck deformity and   Neurologic: AAOx3; MS RUE 3/5, LUE 5/5 and LE 5/5 b/l. Sensation intact. +Aphasia/word finding difficulty. Neg finger to nose.

## 2019-12-05 ENCOUNTER — NON-APPOINTMENT (OUTPATIENT)
Age: 74
End: 2019-12-05

## 2019-12-05 ENCOUNTER — APPOINTMENT (OUTPATIENT)
Dept: NEUROLOGY | Facility: CLINIC | Age: 74
End: 2019-12-05

## 2019-12-05 ENCOUNTER — APPOINTMENT (OUTPATIENT)
Dept: HEART AND VASCULAR | Facility: CLINIC | Age: 74
End: 2019-12-05
Payer: MEDICARE

## 2019-12-05 VITALS
DIASTOLIC BLOOD PRESSURE: 79 MMHG | HEIGHT: 65 IN | SYSTOLIC BLOOD PRESSURE: 122 MMHG | WEIGHT: 235 LBS | BODY MASS INDEX: 39.15 KG/M2 | OXYGEN SATURATION: 96 % | HEART RATE: 66 BPM

## 2019-12-05 DIAGNOSIS — R06.02 SHORTNESS OF BREATH: ICD-10-CM

## 2019-12-05 PROCEDURE — 99214 OFFICE O/P EST MOD 30 MIN: CPT | Mod: 25

## 2019-12-05 PROCEDURE — 93000 ELECTROCARDIOGRAM COMPLETE: CPT

## 2019-12-05 NOTE — HISTORY OF PRESENT ILLNESS
[FreeTextEntry1] : no cp,palpitations- states that on occasion gets OOB after walking a block( Sx are not new)\par still with aphasia and mild Right sided weakness

## 2019-12-05 NOTE — DISCUSSION/SUMMARY
[FreeTextEntry1] : EKG:AF\par moy is due to weight and dCHF\par neuro f/u for old stroke- aphasia/ spasticity\par cont Eliquis for AF, metoprolol + Lasix for HPTN/ dCHF\par form for Eliquis assistance and RX filled out\par states had recent labs with PCP

## 2019-12-05 NOTE — PHYSICAL EXAM
[General Appearance - Well Developed] : well developed [Normal Appearance] : normal appearance [No Deformities] : no deformities [Well Groomed] : well groomed [General Appearance - Well Nourished] : well nourished [General Appearance - In No Acute Distress] : no acute distress [Eyelids - No Xanthelasma] : the eyelids demonstrated no xanthelasmas [Normal Conjunctiva] : the conjunctiva exhibited no abnormalities [Normal Oral Mucosa] : normal oral mucosa [Normal Jugular Venous V Waves Present] : normal jugular venous V waves present [Normal Jugular Venous A Waves Present] : normal jugular venous A waves present [No Jugular Venous Omalley A Waves] : no jugular venous omalley A waves [] : no respiratory distress [Respiration, Rhythm And Depth] : normal respiratory rhythm and effort [Exaggerated Use Of Accessory Muscles For Inspiration] : no accessory muscle use [Auscultation Breath Sounds / Voice Sounds] : lungs were clear to auscultation bilaterally [Edema] : no peripheral edema present [Bowel Sounds] : normal bowel sounds [Abdomen Soft] : soft [Abdomen Tenderness] : non-tender [Nail Clubbing] : no clubbing of the fingernails [FreeTextEntry1] : using cane; right sided weakness- spasticity right hand [Oriented To Time, Place, And Person] : oriented to person, place, and time [Skin Color & Pigmentation] : normal skin color and pigmentation

## 2019-12-05 NOTE — REVIEW OF SYSTEMS
[Fever] : no fever [Headache] : no headache [Recent Weight Gain (___ Lbs)] : no recent weight gain [Chills] : no chills [Feeling Fatigued] : not feeling fatigued [Recent Weight Loss (___ Lbs)] : no recent weight loss [Blurry Vision] : no blurred vision [Eyeglasses] : currently wearing eyeglasses [Loss Of Hearing] : no hearing loss [Dysuria] : no dysuria [Sore Throat] : no sore throat [Joint Pain] : joint pain [Joint Swelling] : no joint swelling [Lower Back Pain] : lower back pain [see HPI] : see HPI [Dizziness] : no dizziness [Numbness (Hypesthesia)] : no numbness [Tremor] : no tremor was seen [Tingling (Paresthesia)] : no tingling [Confusion] : no confusion was observed [Anxiety] : no anxiety [Depression] : no depression [Memory Lapses Or Loss] : memory lapses or loss [Under Stress] : not under stress [Suicidal] : not suicidal [Negative] : Heme/Lymph

## 2020-01-06 ENCOUNTER — LABORATORY RESULT (OUTPATIENT)
Age: 75
End: 2020-01-06

## 2020-01-06 ENCOUNTER — NON-APPOINTMENT (OUTPATIENT)
Age: 75
End: 2020-01-06

## 2020-01-06 ENCOUNTER — APPOINTMENT (OUTPATIENT)
Dept: HEART AND VASCULAR | Facility: CLINIC | Age: 75
End: 2020-01-06
Payer: MEDICARE

## 2020-01-06 VITALS
SYSTOLIC BLOOD PRESSURE: 120 MMHG | BODY MASS INDEX: 38.99 KG/M2 | WEIGHT: 234 LBS | DIASTOLIC BLOOD PRESSURE: 80 MMHG | HEIGHT: 65 IN | HEART RATE: 65 BPM

## 2020-01-06 PROCEDURE — 36415 COLL VENOUS BLD VENIPUNCTURE: CPT

## 2020-01-06 PROCEDURE — 99214 OFFICE O/P EST MOD 30 MIN: CPT | Mod: 24

## 2020-01-06 PROCEDURE — 93000 ELECTROCARDIOGRAM COMPLETE: CPT

## 2020-01-06 RX ORDER — METOPROLOL TARTRATE 50 MG/1
50 TABLET ORAL
Qty: 60 | Refills: 3 | Status: DISCONTINUED | COMMUNITY
End: 2020-01-06

## 2020-01-06 NOTE — HISTORY OF PRESENT ILLNESS
[FreeTextEntry1] : to have colonoscopy and needs cardiac clearance- asked me to draw labs\par no cp- has her chronic 1 block MAYORGA(intermittent), no palpitations

## 2020-01-06 NOTE — REVIEW OF SYSTEMS
[Eyeglasses] : currently wearing eyeglasses [Dyspnea on exertion] : dyspnea during exertion [Joint Pain] : joint pain [Lower Back Pain] : lower back pain [see HPI] : see HPI [Memory Lapses Or Loss] : memory lapses or loss [Negative] : Heme/Lymph [Fever] : no fever [Headache] : no headache [Recent Weight Gain (___ Lbs)] : no recent weight gain [Chills] : no chills [Recent Weight Loss (___ Lbs)] : no recent weight loss [Feeling Fatigued] : not feeling fatigued [Loss Of Hearing] : no hearing loss [Blurry Vision] : no blurred vision [Chest  Pressure] : no chest pressure [Sore Throat] : no sore throat [Chest Pain] : no chest pain [Lower Ext Edema] : no extremity edema [Leg Claudication] : no intermittent leg claudication [Palpitations] : no palpitations [Dysuria] : no dysuria [Joint Swelling] : no joint swelling [Tremor] : no tremor was seen [Numbness (Hypesthesia)] : no numbness [Dizziness] : no dizziness [Tingling (Paresthesia)] : no tingling [Confusion] : no confusion was observed [Anxiety] : no anxiety [Depression] : no depression [Under Stress] : not under stress [Suicidal] : not suicidal

## 2020-01-06 NOTE — PHYSICAL EXAM
[General Appearance - Well Developed] : well developed [Normal Appearance] : normal appearance [Well Groomed] : well groomed [No Deformities] : no deformities [General Appearance - Well Nourished] : well nourished [General Appearance - In No Acute Distress] : no acute distress [Normal Conjunctiva] : the conjunctiva exhibited no abnormalities [Eyelids - No Xanthelasma] : the eyelids demonstrated no xanthelasmas [Normal Oral Mucosa] : normal oral mucosa [Normal Jugular Venous A Waves Present] : normal jugular venous A waves present [Normal Jugular Venous V Waves Present] : normal jugular venous V waves present [No Jugular Venous Omalley A Waves] : no jugular venous omalley A waves [] : no respiratory distress [Exaggerated Use Of Accessory Muscles For Inspiration] : no accessory muscle use [Respiration, Rhythm And Depth] : normal respiratory rhythm and effort [Auscultation Breath Sounds / Voice Sounds] : lungs were clear to auscultation bilaterally [Edema] : no peripheral edema present [Bowel Sounds] : normal bowel sounds [Abdomen Tenderness] : non-tender [Abdomen Soft] : soft [Nail Clubbing] : no clubbing of the fingernails [Skin Color & Pigmentation] : normal skin color and pigmentation [Oriented To Time, Place, And Person] : oriented to person, place, and time [FreeTextEntry1] : using cane; right sided weakness-

## 2020-01-06 NOTE — DISCUSSION/SUMMARY
[FreeTextEntry1] : EKG:AF\par cont Eliquis + Metoprolol( would change from Tartrate to succinate) for AF;lipitor for lipids,Lasix +Lopressor for dCHF\par I will draw labs at patient request\par I feel her MAYORGA is due to weight but will draw proBNP and get stress test\par Her cardiac status is stable enough to permit colonoscopy- given her CVA she is at increased risk for interruption in anticoagulation and would have neurologic clearance before interruption- from a cardiac viewpoint she can hold Eliquis the day before procedure and resume ASAP post procedure

## 2020-02-04 RX ORDER — METOPROLOL SUCCINATE 50 MG/1
50 TABLET, EXTENDED RELEASE ORAL
Qty: 180 | Refills: 1 | Status: ACTIVE | COMMUNITY
Start: 2020-01-06 | End: 1900-01-01

## 2020-02-04 RX ORDER — ATORVASTATIN CALCIUM 40 MG/1
40 TABLET, FILM COATED ORAL
Qty: 1 | Refills: 1 | Status: ACTIVE | COMMUNITY
Start: 2018-06-12 | End: 1900-01-01

## 2020-02-27 ENCOUNTER — RESULT REVIEW (OUTPATIENT)
Age: 75
End: 2020-02-27

## 2020-02-27 ENCOUNTER — OUTPATIENT (OUTPATIENT)
Dept: OUTPATIENT SERVICES | Facility: HOSPITAL | Age: 75
LOS: 1 days | Discharge: HOME | End: 2020-02-27
Payer: MEDICARE

## 2020-02-27 PROCEDURE — 88305 TISSUE EXAM BY PATHOLOGIST: CPT | Mod: 26

## 2020-02-27 PROCEDURE — 45380 COLONOSCOPY AND BIOPSY: CPT

## 2020-02-27 PROCEDURE — 88305 TISSUE EXAM BY PATHOLOGIST: CPT

## 2020-03-02 ENCOUNTER — NON-APPOINTMENT (OUTPATIENT)
Age: 75
End: 2020-03-02

## 2020-03-02 ENCOUNTER — APPOINTMENT (OUTPATIENT)
Dept: HEART AND VASCULAR | Facility: CLINIC | Age: 75
End: 2020-03-02
Payer: MEDICARE

## 2020-03-02 VITALS
BODY MASS INDEX: 39.65 KG/M2 | WEIGHT: 238 LBS | HEART RATE: 63 BPM | DIASTOLIC BLOOD PRESSURE: 70 MMHG | SYSTOLIC BLOOD PRESSURE: 130 MMHG | HEIGHT: 65 IN

## 2020-03-02 PROCEDURE — 93000 ELECTROCARDIOGRAM COMPLETE: CPT | Mod: NC

## 2020-03-02 PROCEDURE — 99214 OFFICE O/P EST MOD 30 MIN: CPT | Mod: 25

## 2020-03-02 NOTE — PHYSICAL EXAM
[Well Groomed] : well groomed [Normal Appearance] : normal appearance [General Appearance - Well Developed] : well developed [General Appearance - Well Nourished] : well nourished [General Appearance - In No Acute Distress] : no acute distress [No Deformities] : no deformities [Eyelids - No Xanthelasma] : the eyelids demonstrated no xanthelasmas [Normal Conjunctiva] : the conjunctiva exhibited no abnormalities [Normal Jugular Venous A Waves Present] : normal jugular venous A waves present [Normal Oral Mucosa] : normal oral mucosa [Normal Jugular Venous V Waves Present] : normal jugular venous V waves present [No Jugular Venous Omalley A Waves] : no jugular venous omalley A waves [Respiration, Rhythm And Depth] : normal respiratory rhythm and effort [Exaggerated Use Of Accessory Muscles For Inspiration] : no accessory muscle use [] : no respiratory distress [Auscultation Breath Sounds / Voice Sounds] : lungs were clear to auscultation bilaterally [Edema] : no peripheral edema present [Abdomen Tenderness] : non-tender [Abdomen Soft] : soft [Bowel Sounds] : normal bowel sounds [Nail Clubbing] : no clubbing of the fingernails [Skin Color & Pigmentation] : normal skin color and pigmentation [Oriented To Time, Place, And Person] : oriented to person, place, and time [FreeTextEntry1] : irregular

## 2020-03-02 NOTE — REVIEW OF SYSTEMS
[Eyeglasses] : currently wearing eyeglasses [Lower Back Pain] : lower back pain [Joint Pain] : joint pain [see HPI] : see HPI [Memory Lapses Or Loss] : memory lapses or loss [Negative] : Heme/Lymph [Fever] : no fever [Headache] : no headache [Feeling Fatigued] : not feeling fatigued [Recent Weight Gain (___ Lbs)] : no recent weight gain [Chills] : no chills [Recent Weight Loss (___ Lbs)] : no recent weight loss [Blurry Vision] : no blurred vision [Loss Of Hearing] : no hearing loss [Sore Throat] : no sore throat [Chest  Pressure] : no chest pressure [Dyspnea on exertion] : not dyspnea during exertion [Leg Claudication] : no intermittent leg claudication [Lower Ext Edema] : no extremity edema [Chest Pain] : no chest pain [Dysuria] : no dysuria [Palpitations] : no palpitations [Joint Swelling] : no joint swelling [Dizziness] : no dizziness [Tremor] : no tremor was seen [Tingling (Paresthesia)] : no tingling [Numbness (Hypesthesia)] : no numbness [Depression] : no depression [Confusion] : no confusion was observed [Anxiety] : no anxiety [Under Stress] : not under stress [Suicidal] : not suicidal

## 2020-03-02 NOTE — DISCUSSION/SUMMARY
[FreeTextEntry1] : EKG:AF\par cont metoprolol + Eliquis for AF; metoprolol + Lasix for dCHF/HPTN\par lipitor for lipids/sp CVA\par Her cardiac status is stable enough to permit orthopedic surgery.\par Given her CVA she is at increased risk if anticoagulation is interrupted so if at all possible would not interrupt anticoagulation. If Eliquis must be held . she should have neurologic clearance before doing so.\par If anticoagulation to be held would stop for shortest time period preop( if surgery is lo risk of bleeding day before surgery and morning of and if surgery is deemed high bleeding risk then 2 days before and day of) and resume asap post surgery

## 2020-03-03 LAB — SURGICAL PATHOLOGY STUDY: SIGNIFICANT CHANGE UP

## 2020-04-29 ENCOUNTER — RX RENEWAL (OUTPATIENT)
Age: 75
End: 2020-04-29

## 2020-05-21 ENCOUNTER — APPOINTMENT (OUTPATIENT)
Dept: NEUROLOGY | Facility: CLINIC | Age: 75
End: 2020-05-21
Payer: MEDICARE

## 2020-05-21 DIAGNOSIS — R25.9 OTHER SEQUELAE OF CEREBRAL INFARCTION: ICD-10-CM

## 2020-05-21 DIAGNOSIS — I69.920 APHASIA FOLLOWING UNSPECIFIED CEREBROVASCULAR DISEASE: ICD-10-CM

## 2020-05-21 DIAGNOSIS — I69.398 OTHER SEQUELAE OF CEREBRAL INFARCTION: ICD-10-CM

## 2020-05-21 PROCEDURE — 99443: CPT

## 2020-05-21 NOTE — HISTORY OF PRESENT ILLNESS
[Home] : at home, [unfilled] , at the time of the visit. [Other Location: e.g. Home (Enter Location, City,State)___] : at [unfilled] [Verbal consent obtained from patient] : the patient, [unfilled] [FreeTextEntry1] : 74 year-old RH female with PMH atrial fibrillation, HTN presents for follow up visit for strokes in 2019 with residual aphasia and right hemiparesis.  She's doing well overall.\par \par Residual Stroke deficits - \par Aphasia - She can get mixed up but improves when she talks slowly.  Her last speech therapy was in December, 2019.\par Motor - She's hoping to have surgery on her fingers at [facility] since they're flexed closed.  She needs neurological clearance.  She had Botox in the past without improvement.\par - She's walking around with walker or cane without any falls.  She restarted walking and exercise now that the weather is nice.\par [facility] [phone], Dr. Doan\par \par She saw her Rheumatologist, Dr. Ramsey, for diagnosis of Rheumatoid Arthritis with addition of new medication that she takes every 10 days.\par \par Stroke risk factors - \par Atrial fibrillation - No palpitations.  She takes the Eliquis regularly.  No bleeding in urine or stool.  She follows up with Dr. Maradiaga every three months.\par HTN - controlled \par Atorvastatin - No muscle pains or cramps.\par \par She now has HHA for four hours, one day per week.  She cooks and cleans.

## 2020-05-21 NOTE — ASSESSMENT
[FreeTextEntry1] : 74 year-old RH female presents for follow up of stroke with residual aphasia and right hemiparesis complicated by spasticity.  \par \par Plan:\par 1) Secondary stroke prevention - \par a) Continue Eliquis 5mg BID for anticoagulation\par b) Continue Atorvastatin 40mg for vessel protection since LDL at goal. \par \par 2) Stroke risk factors - \par a) Atrial fibrillation - Eliquis and rate control with Lopressor\par b) HTN - Continue antihypertensives\par c) Hyperlipidemia - see above\par \par 3) Further treatment - \par a) Encouraged continued exercise \par b) Encouraged practicing her speaking daily.  Restart speech therapy after COVID.  \par \par 4) She is neurologically cleared for upcoming hand surgery.  She can hold the Eliquis for minimal amount of time prior to procedure with restart when safe afterwards.  She should be aware of any new weakness, numbness, speech or visual deficits.\par \par Thank you for allowing me to participate in the care of your patient.  If you have any questions, please feel free to call me at [phone].

## 2020-05-21 NOTE — PHYSICAL EXAM
[FreeTextEntry1] : speech is stuttered at times with trouble finding words and/or completing sentences

## 2020-05-21 NOTE — CONSULT LETTER
[Dear  ___] : Dear  [unfilled], [Courtesy Letter:] : I had the pleasure of seeing your patient, [unfilled], in my office today. [FreeTextEntry2] : Meka Doan MD\par [address] [place] [address]\[place] [address]

## 2020-05-21 NOTE — REVIEW OF SYSTEMS
[As Noted in HPI] : as noted in HPI [Negative] : Heme/Lymph [de-identified] : rash for 2 weeks on arm after changed soap.

## 2020-07-13 ENCOUNTER — NON-APPOINTMENT (OUTPATIENT)
Age: 75
End: 2020-07-13

## 2020-07-13 ENCOUNTER — APPOINTMENT (OUTPATIENT)
Dept: HEART AND VASCULAR | Facility: CLINIC | Age: 75
End: 2020-07-13
Payer: MEDICARE

## 2020-07-13 VITALS
TEMPERATURE: 98.7 F | BODY MASS INDEX: 39.99 KG/M2 | WEIGHT: 240 LBS | SYSTOLIC BLOOD PRESSURE: 140 MMHG | HEART RATE: 60 BPM | DIASTOLIC BLOOD PRESSURE: 80 MMHG | HEIGHT: 65 IN

## 2020-07-13 DIAGNOSIS — Z01.810 ENCOUNTER FOR PREPROCEDURAL CARDIOVASCULAR EXAMINATION: ICD-10-CM

## 2020-07-13 DIAGNOSIS — I34.0 NONRHEUMATIC MITRAL (VALVE) INSUFFICIENCY: ICD-10-CM

## 2020-07-13 PROCEDURE — 93306 TTE W/DOPPLER COMPLETE: CPT

## 2020-07-13 PROCEDURE — 93000 ELECTROCARDIOGRAM COMPLETE: CPT | Mod: NC

## 2020-07-13 PROCEDURE — 99214 OFFICE O/P EST MOD 30 MIN: CPT | Mod: 25

## 2020-07-13 NOTE — REVIEW OF SYSTEMS
[see HPI] : see HPI [Eyeglasses] : currently wearing eyeglasses [Dyspnea on exertion] : dyspnea during exertion [Joint Pain] : joint pain [Hand Stiffness] : stiffness of the hand [Lower Back Pain] : lower back pain [Memory Lapses Or Loss] : memory lapses or loss [Negative] : Heme/Lymph [Fever] : no fever [Headache] : no headache [Recent Weight Gain (___ Lbs)] : no recent weight gain [Chills] : no chills [Feeling Fatigued] : not feeling fatigued [Recent Weight Loss (___ Lbs)] : no recent weight loss [Blurry Vision] : no blurred vision [Loss Of Hearing] : no hearing loss [Sore Throat] : no sore throat [Chest  Pressure] : no chest pressure [Chest Pain] : no chest pain [Lower Ext Edema] : no extremity edema [Leg Claudication] : no intermittent leg claudication [Palpitations] : no palpitations [Dysuria] : no dysuria [Joint Swelling] : no joint swelling [Dizziness] : no dizziness [Tremor] : no tremor was seen [Numbness (Hypesthesia)] : no numbness [Confusion] : no confusion was observed [Tingling (Paresthesia)] : no tingling [Depression] : no depression [Anxiety] : no anxiety [Under Stress] : not under stress [Suicidal] : not suicidal

## 2020-07-13 NOTE — PHYSICAL EXAM
[General Appearance - Well Developed] : well developed [Normal Appearance] : normal appearance [Well Groomed] : well groomed [General Appearance - Well Nourished] : well nourished [No Deformities] : no deformities [General Appearance - In No Acute Distress] : no acute distress [Eyelids - No Xanthelasma] : the eyelids demonstrated no xanthelasmas [Normal Conjunctiva] : the conjunctiva exhibited no abnormalities [Normal Oral Mucosa] : normal oral mucosa [Normal Jugular Venous A Waves Present] : normal jugular venous A waves present [Normal Jugular Venous V Waves Present] : normal jugular venous V waves present [No Jugular Venous Omalley A Waves] : no jugular venous omalley A waves [] : no respiratory distress [Respiration, Rhythm And Depth] : normal respiratory rhythm and effort [Exaggerated Use Of Accessory Muscles For Inspiration] : no accessory muscle use [Auscultation Breath Sounds / Voice Sounds] : lungs were clear to auscultation bilaterally [Edema] : no peripheral edema present [Bowel Sounds] : normal bowel sounds [Abdomen Soft] : soft [Abdomen Tenderness] : non-tender [Skin Color & Pigmentation] : normal skin color and pigmentation [Oriented To Time, Place, And Person] : oriented to person, place, and time [FreeTextEntry1] : RUR hemiparesis, right hand clenched

## 2020-07-13 NOTE — DISCUSSION/SUMMARY
[FreeTextEntry1] : EKG:AF\par ECHO; normal LVEF, moderate MR/TR with pulmonary hptn\par cont Eliquis for AF; Lasix + metoprolol for dCHF/BP;;lipitor for lipids\par I feel her MAYORGA (is chronic) and weight related and/or dCHF\par Her cardiac status is stable enough to permit hand surgery, If Eliquis must be held , she can, from a cardiologic standpoint, hold Eliquis for 2 days presurgery and resume asap post surgery

## 2020-08-20 NOTE — ED ADULT NURSE REASSESSMENT NOTE - TEMPLATE LIST FOR HEAD TO TOE ASSESSMENT
08/20/20 1301   Recommendations/Interventions   Summary Patient remains NPO, cyclic EN currently on hold secondary to drainage around J-tube site that looks like stool per RN  Patient was previously tolerating 18 hour cyclic feeds of Osmolite 1 5  Patient is s/p J-tube exchange 8/18  Nursing skin care plan reviewed  Nutrition Recommendations Other (specify)  (When medically indicated suggest resume previous cyclic EN order of Osmolite 1 5 kcal @ 85 ml/hr x 18 hours with 100 ml free water flush every 6 hours (2295 kcal, 95 grams protein, 1565 ml tv)   Monitor electrolytes and weight ) Cardiac

## 2020-11-23 ENCOUNTER — APPOINTMENT (OUTPATIENT)
Dept: NEUROLOGY | Facility: CLINIC | Age: 75
End: 2020-11-23

## 2021-01-11 ENCOUNTER — APPOINTMENT (OUTPATIENT)
Dept: HEART AND VASCULAR | Facility: CLINIC | Age: 76
End: 2021-01-11
Payer: MEDICARE

## 2021-01-11 ENCOUNTER — NON-APPOINTMENT (OUTPATIENT)
Age: 76
End: 2021-01-11

## 2021-01-11 VITALS
HEART RATE: 64 BPM | SYSTOLIC BLOOD PRESSURE: 132 MMHG | WEIGHT: 229 LBS | DIASTOLIC BLOOD PRESSURE: 60 MMHG | BODY MASS INDEX: 38.15 KG/M2 | TEMPERATURE: 97.2 F | HEIGHT: 65 IN

## 2021-01-11 PROCEDURE — 93000 ELECTROCARDIOGRAM COMPLETE: CPT

## 2021-01-11 PROCEDURE — 99072 ADDL SUPL MATRL&STAF TM PHE: CPT

## 2021-01-11 PROCEDURE — 99214 OFFICE O/P EST MOD 30 MIN: CPT | Mod: 25

## 2021-01-11 PROCEDURE — 36415 COLL VENOUS BLD VENIPUNCTURE: CPT

## 2021-01-11 NOTE — DISCUSSION/SUMMARY
[FreeTextEntry1] : EKG:AF\par get labs from PCP- asked me to fill out assistance form for Eliquis\par cont Eliquis +BB for AF;lipitor for lipids;Lasix +BB for dCHF\par RV 3 months for AF/dCHF

## 2021-01-20 RX ORDER — APIXABAN 5 MG/1
5 TABLET, FILM COATED ORAL
Qty: 180 | Refills: 3 | Status: ACTIVE | OUTPATIENT
Start: 2018-05-07

## 2021-01-26 NOTE — ED ADULT NURSE NOTE - CCCP TRG CHIEF CMPLNT
I reviewed the H&P, I examined the patient, and there are no changes in the patient's condition.     abd pain/chest pain

## 2021-03-13 NOTE — PHYSICAL THERAPY INITIAL EVALUATION ADULT - GAIT DEVIATIONS NOTED, PT EVAL
None decreased step length/decreased stride length/intermittent min unsteadiness, req verbal cueing for proper sequencing with SC; dec RLE hip flexion, min RLE circumduction/decreased justa/increased time in double stance

## 2021-06-10 ENCOUNTER — NON-APPOINTMENT (OUTPATIENT)
Age: 76
End: 2021-06-10

## 2021-06-10 ENCOUNTER — APPOINTMENT (OUTPATIENT)
Dept: HEART AND VASCULAR | Facility: CLINIC | Age: 76
End: 2021-06-10
Payer: MEDICARE

## 2021-06-10 VITALS
TEMPERATURE: 98.3 F | BODY MASS INDEX: 41.32 KG/M2 | SYSTOLIC BLOOD PRESSURE: 143 MMHG | HEIGHT: 65 IN | WEIGHT: 248 LBS | HEART RATE: 55 BPM | DIASTOLIC BLOOD PRESSURE: 86 MMHG

## 2021-06-10 DIAGNOSIS — R06.00 DYSPNEA, UNSPECIFIED: ICD-10-CM

## 2021-06-10 DIAGNOSIS — E78.5 HYPERLIPIDEMIA, UNSPECIFIED: ICD-10-CM

## 2021-06-10 DIAGNOSIS — I27.20 PULMONARY HYPERTENSION, UNSPECIFIED: ICD-10-CM

## 2021-06-10 PROCEDURE — 99072 ADDL SUPL MATRL&STAF TM PHE: CPT

## 2021-06-10 PROCEDURE — 93000 ELECTROCARDIOGRAM COMPLETE: CPT

## 2021-06-10 PROCEDURE — 99214 OFFICE O/P EST MOD 30 MIN: CPT | Mod: 25

## 2021-06-10 PROCEDURE — 93306 TTE W/DOPPLER COMPLETE: CPT

## 2021-06-10 PROCEDURE — 36415 COLL VENOUS BLD VENIPUNCTURE: CPT

## 2021-06-10 PROCEDURE — ZZZZZ: CPT

## 2021-06-10 NOTE — REVIEW OF SYSTEMS
[Recent Weight Loss (___ Lbs)] : recent [unfilled] ~Ulb weight loss [see HPI] : see HPI [Eyeglasses] : currently wearing eyeglasses [Foot Pain] : foot pain [Fever] : no fever [Headache] : no headache [Recent Weight Gain (___ Lbs)] : no recent weight gain [Chills] : no chills [Feeling Fatigued] : not feeling fatigued [Blurry Vision] : no blurred vision [Loss Of Hearing] : no hearing loss [Sore Throat] : no sore throat [Chest  Pressure] : no chest pressure [Chest Pain] : no chest pain [Leg Claudication] : no intermittent leg claudication [Palpitations] : no palpitations [Dysuria] : no dysuria [Joint Swelling] : no joint swelling [Dizziness] : no dizziness [Tremor] : no tremor was seen [Numbness (Hypesthesia)] : no numbness [Tingling (Paresthesia)] : no tingling [Confusion] : no confusion was observed [Depression] : no depression [Anxiety] : no anxiety [Under Stress] : not under stress [Suicidal] : not suicidal [Weight Gain (___ Lbs)] : [unfilled] ~Ulb weight gain [Dyspnea on exertion] : dyspnea during exertion [Lower Ext Edema] : lower extremity edema [Joint Pain] : joint pain [Lower Back Pain] : lower back pain [Memory Lapses Or Loss] : memory lapses or loss [Negative] : Heme/Lymph

## 2021-06-10 NOTE — DISCUSSION/SUMMARY
[FreeTextEntry1] : EKG:AF\par feel leg edema due to dCHF and pulmonary HPTN- will increase Lasix and cont BB for dCHF; Eliquis for af;lipitor for lipids\par f/u with Dr Hamlin\par pt will call me next week- daily weights\par \par echo; normal LVEF,moderate pulmonary hptn,MR/TR- f/u with pulmonary and PCP

## 2021-06-10 NOTE — PHYSICAL EXAM
[General Appearance - Well Developed] : well developed [Normal Appearance] : normal appearance [Well Groomed] : well groomed [General Appearance - Well Nourished] : well nourished [No Deformities] : no deformities [General Appearance - In No Acute Distress] : no acute distress [Eyelids - No Xanthelasma] : the eyelids demonstrated no xanthelasmas [Normal Oral Mucosa] : normal oral mucosa [Normal Jugular Venous A Waves Present] : normal jugular venous A waves present [Normal Jugular Venous V Waves Present] : normal jugular venous V waves present [No Jugular Venous Omalley A Waves] : no jugular venous omalley A waves [] : no respiratory distress [Respiration, Rhythm And Depth] : normal respiratory rhythm and effort [Exaggerated Use Of Accessory Muscles For Inspiration] : no accessory muscle use [Auscultation Breath Sounds / Voice Sounds] : lungs were clear to auscultation bilaterally [Edema] : no peripheral edema present [Bowel Sounds] : normal bowel sounds [Abdomen Tenderness] : non-tender [Abdomen Soft] : soft [Nail Clubbing] : no clubbing of the fingernails [Skin Color & Pigmentation] : normal skin color and pigmentation [FreeTextEntry1] : cane [Well Developed] : well developed [Well Nourished] : well nourished [No Acute Distress] : no acute distress [Obese] : obese [Normal Conjunctiva] : normal conjunctiva [Normal Venous Pressure] : normal venous pressure [No Carotid Bruit] : no carotid bruit [Normal S1, S2] : normal S1, S2 [No Rub] : no rub [Clear Lung Fields] : clear lung fields [Good Air Entry] : good air entry [No Respiratory Distress] : no respiratory distress  [Soft] : abdomen soft [Non Tender] : non-tender [No Cyanosis] : no cyanosis [No Clubbing] : no clubbing [No Varicosities] : no varicosities [No Rash] : no rash [No Skin Lesions] : no skin lesions [Moves all extremities] : moves all extremities [No Focal Deficits] : no focal deficits [Normal Speech] : normal speech [Alert and Oriented] : alert and oriented [Normal memory] : normal memory

## 2021-06-10 NOTE — REVIEW OF SYSTEMS
[Headache] : no headache [Feeling Fatigued] : not feeling fatigued [Chest Discomfort] : no chest discomfort [Leg Claudication] : no intermittent leg claudication [Palpitations] : no palpitations [Orthopnea] : no orthopnea [PND] : no PND [Dysuria] : no dysuria [Anxiety] : no anxiety

## 2021-06-10 NOTE — HISTORY OF PRESENT ILLNESS
[FreeTextEntry1] : for past 2-3 2weeks has had increasing leg edema- in morning good and gets worse as day progresses- mild increase in moy since gained weight. no cp

## 2021-06-11 LAB
ANION GAP SERPL CALC-SCNC: 10 MMOL/L
BUN SERPL-MCNC: 15 MG/DL
CALCIUM SERPL-MCNC: 9.2 MG/DL
CHLORIDE SERPL-SCNC: 105 MMOL/L
CO2 SERPL-SCNC: 27 MMOL/L
CREAT SERPL-MCNC: 0.79 MG/DL
GLUCOSE SERPL-MCNC: 89 MG/DL
NT-PROBNP SERPL-MCNC: 833 PG/ML
POTASSIUM SERPL-SCNC: 4.4 MMOL/L
SODIUM SERPL-SCNC: 142 MMOL/L

## 2021-06-17 ENCOUNTER — NON-APPOINTMENT (OUTPATIENT)
Age: 76
End: 2021-06-17

## 2021-06-22 ENCOUNTER — NON-APPOINTMENT (OUTPATIENT)
Age: 76
End: 2021-06-22

## 2021-06-22 ENCOUNTER — APPOINTMENT (OUTPATIENT)
Dept: HEART AND VASCULAR | Facility: CLINIC | Age: 76
End: 2021-06-22
Payer: MEDICARE

## 2021-06-22 VITALS
HEIGHT: 65 IN | WEIGHT: 242 LBS | DIASTOLIC BLOOD PRESSURE: 68 MMHG | TEMPERATURE: 98.6 F | SYSTOLIC BLOOD PRESSURE: 110 MMHG | HEART RATE: 63 BPM | BODY MASS INDEX: 40.32 KG/M2

## 2021-06-22 DIAGNOSIS — I50.30 UNSPECIFIED DIASTOLIC (CONGESTIVE) HEART FAILURE: ICD-10-CM

## 2021-06-22 DIAGNOSIS — I10 ESSENTIAL (PRIMARY) HYPERTENSION: ICD-10-CM

## 2021-06-22 DIAGNOSIS — R60.0 LOCALIZED EDEMA: ICD-10-CM

## 2021-06-22 DIAGNOSIS — I48.91 UNSPECIFIED ATRIAL FIBRILLATION: ICD-10-CM

## 2021-06-22 PROCEDURE — 36415 COLL VENOUS BLD VENIPUNCTURE: CPT

## 2021-06-22 PROCEDURE — 99072 ADDL SUPL MATRL&STAF TM PHE: CPT

## 2021-06-22 PROCEDURE — 93000 ELECTROCARDIOGRAM COMPLETE: CPT

## 2021-06-22 PROCEDURE — 99213 OFFICE O/P EST LOW 20 MIN: CPT | Mod: 25

## 2021-06-22 NOTE — DISCUSSION/SUMMARY
[FreeTextEntry1] : EKG:AF\par cont lasix for leg edema; lasix +toprol for dCHF ;eliquis +Toprol for af\par f/u with Dr Hamlin\par check labs

## 2021-06-22 NOTE — PHYSICAL EXAM
[Obese] : obese [Normal Conjunctiva] : normal conjunctiva [Normal Venous Pressure] : normal venous pressure [Normal] : clear lung fields, good air entry, no respiratory distress [Soft] : abdomen soft [Alert and Oriented] : alert and oriented [de-identified] : irregular [de-identified] : uses cane  [de-identified] : 1+ bilateral LE edema [de-identified] : RUE weakness; speech unclear

## 2021-06-23 LAB
ANION GAP SERPL CALC-SCNC: 10 MMOL/L
BUN SERPL-MCNC: 16 MG/DL
CALCIUM SERPL-MCNC: 9.1 MG/DL
CHLORIDE SERPL-SCNC: 103 MMOL/L
CO2 SERPL-SCNC: 26 MMOL/L
CREAT SERPL-MCNC: 0.85 MG/DL
GLUCOSE SERPL-MCNC: 94 MG/DL
NT-PROBNP SERPL-MCNC: 727 PG/ML
POTASSIUM SERPL-SCNC: 4 MMOL/L
SODIUM SERPL-SCNC: 140 MMOL/L

## 2021-07-08 ENCOUNTER — APPOINTMENT (OUTPATIENT)
Dept: HEART AND VASCULAR | Facility: CLINIC | Age: 76
End: 2021-07-08

## 2022-01-03 ENCOUNTER — APPOINTMENT (OUTPATIENT)
Dept: HEART AND VASCULAR | Facility: CLINIC | Age: 77
End: 2022-01-03

## 2022-01-20 ENCOUNTER — APPOINTMENT (OUTPATIENT)
Dept: HEART AND VASCULAR | Facility: CLINIC | Age: 77
End: 2022-01-20

## 2022-04-28 ENCOUNTER — APPOINTMENT (OUTPATIENT)
Dept: HEART AND VASCULAR | Facility: CLINIC | Age: 77
End: 2022-04-28

## 2025-01-02 NOTE — PATIENT PROFILE ADULT - NSPROHMSYMPCOND_GEN_A_NUR
Negative for discharge and redness.   Respiratory:  Positive for cough. Negative for choking and wheezing.    Cardiovascular:  Negative for chest pain.   Gastrointestinal:  Negative for blood in stool, constipation and diarrhea.   Genitourinary:  Negative for decreased urine volume and dysuria.   Skin:  Negative for rash.   Neurological:  Negative for weakness.   Hematological:  Negative for adenopathy.   Psychiatric/Behavioral:  Negative for suicidal ideas.           Objective   Blood pressure 115/75, pulse 75, temperature 97.2 °F (36.2 °C), temperature source Temporal, weight 64.4 kg (142 lb), SpO2 90%.  Physical Exam       Physical Exam  Vitals reviewed.   Constitutional:       Appearance: He is well-developed.   HENT:      Head: Normocephalic.      Nose: No congestion.   Eyes:      Conjunctiva/sclera: Conjunctivae normal.   Cardiovascular:      Rate and Rhythm: Normal rate and regular rhythm.      Heart sounds: Normal heart sounds.   Pulmonary:      Effort: Pulmonary effort is normal.      Breath sounds: Normal breath sounds.   Musculoskeletal:      Cervical back: Normal range of motion and neck supple.   Skin:     General: Skin is warm and dry.   Neurological:      Mental Status: He is alert and oriented to person, place, and time.   Psychiatric:         Behavior: Behavior normal.                 An electronic signature was used to authenticate this note.    --LAKEISHA Dodson      cardiovascular